# Patient Record
Sex: MALE | Race: WHITE | NOT HISPANIC OR LATINO | URBAN - METROPOLITAN AREA
[De-identification: names, ages, dates, MRNs, and addresses within clinical notes are randomized per-mention and may not be internally consistent; named-entity substitution may affect disease eponyms.]

---

## 2019-11-12 VITALS
SYSTOLIC BLOOD PRESSURE: 99 MMHG | HEIGHT: 66 IN | TEMPERATURE: 98 F | WEIGHT: 197.09 LBS | OXYGEN SATURATION: 97 % | HEART RATE: 71 BPM | RESPIRATION RATE: 16 BRPM | DIASTOLIC BLOOD PRESSURE: 72 MMHG

## 2019-11-12 RX ORDER — GABAPENTIN 400 MG/1
1 CAPSULE ORAL
Qty: 0 | Refills: 0 | DISCHARGE

## 2019-11-12 RX ORDER — POVIDONE-IODINE 5 %
1 AEROSOL (ML) TOPICAL ONCE
Refills: 0 | Status: COMPLETED | OUTPATIENT
Start: 2019-11-13 | End: 2019-11-13

## 2019-11-12 RX ORDER — INFLUENZA VIRUS VACCINE 15; 15; 15; 15 UG/.5ML; UG/.5ML; UG/.5ML; UG/.5ML
0.5 SUSPENSION INTRAMUSCULAR ONCE
Refills: 0 | Status: DISCONTINUED | OUTPATIENT
Start: 2019-11-13 | End: 2019-11-19

## 2019-11-12 NOTE — H&P ADULT - NSHPLABSRESULTS_GEN_ALL_CORE
Preop CBC, BMP, PT/PTT/INR, UA within normal limits- reviewed by medical clearance.   Preop EKG: NSR, reviewed by medical clearance.   CXR: Within normal limits, per medical clearance.

## 2019-11-12 NOTE — H&P ADULT - NSHPPHYSICALEXAM_GEN_ALL_CORE
PE: Decreased ROM to lumbar spine secondary to pain  Rest of PE per medical clearance General: Alert and oriented, NAD  MSK:  Decreased ROM of lumbar spine secondary to pain.  EHL/FHL/TA/Gastro ******  DP's ****  Gross sensation to light touch intact throughout lower extremities **  Remainder of PE as per medical clearance General: Alert and oriented, NAD  MSK:  Decreased ROM of lumbar spine secondary to pain.  RLE EHL/FHL/TA/Gastro   LLE EHL/FHL/TA/Gastro  DP's palpable b/l  Gross sensation to light touch intact throughout lower extremities b/l  Remainder of PE as per medical clearance General: Alert and oriented, NAD  MSK:  Decreased ROM of lumbar spine secondary to pain.  EHL/FHL/TA/Gastro 5/5 b/l  DP's palpable b/l  Gross sensation to light touch intact throughout lower extremities b/l  Remainder of PE as per medical clearance
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
## 2019-11-12 NOTE — H&P ADULT - PROBLEM SELECTOR PLAN 1
Admit to Orthopaedic Service.  Presents today for elective L2-S1 ASF/PSF  Pt medically stable and cleared for procedure today by Dr. Siddiqi

## 2019-11-13 ENCOUNTER — INPATIENT (INPATIENT)
Facility: HOSPITAL | Age: 54
LOS: 5 days | Discharge: HOME | DRG: 454 | End: 2019-11-19
Attending: ORTHOPAEDIC SURGERY | Admitting: ORTHOPAEDIC SURGERY
Payer: COMMERCIAL

## 2019-11-13 ENCOUNTER — RESULT REVIEW (OUTPATIENT)
Age: 54
End: 2019-11-13

## 2019-11-13 DIAGNOSIS — K57.92 DIVERTICULITIS OF INTESTINE, PART UNSPECIFIED, WITHOUT PERFORATION OR ABSCESS WITHOUT BLEEDING: ICD-10-CM

## 2019-11-13 DIAGNOSIS — Z90.49 ACQUIRED ABSENCE OF OTHER SPECIFIED PARTS OF DIGESTIVE TRACT: Chronic | ICD-10-CM

## 2019-11-13 DIAGNOSIS — Z98.890 OTHER SPECIFIED POSTPROCEDURAL STATES: Chronic | ICD-10-CM

## 2019-11-13 DIAGNOSIS — M54.16 RADICULOPATHY, LUMBAR REGION: ICD-10-CM

## 2019-11-13 DIAGNOSIS — K21.0 GASTRO-ESOPHAGEAL REFLUX DISEASE WITH ESOPHAGITIS: ICD-10-CM

## 2019-11-13 PROCEDURE — 22585 ARTHRD ANT NTRBD MIN DSC EA: CPT | Mod: GC,62

## 2019-11-13 PROCEDURE — 22558 ARTHRD ANT NTRBD MIN DSC LUM: CPT | Mod: GC,62

## 2019-11-13 RX ORDER — POLYETHYLENE GLYCOL 3350 17 G/17G
17 POWDER, FOR SOLUTION ORAL DAILY
Refills: 0 | Status: DISCONTINUED | OUTPATIENT
Start: 2019-11-13 | End: 2019-11-19

## 2019-11-13 RX ORDER — OXYCODONE HYDROCHLORIDE 5 MG/1
10 TABLET ORAL EVERY 4 HOURS
Refills: 0 | Status: DISCONTINUED | OUTPATIENT
Start: 2019-11-13 | End: 2019-11-19

## 2019-11-13 RX ORDER — MAGNESIUM HYDROXIDE 400 MG/1
30 TABLET, CHEWABLE ORAL DAILY
Refills: 0 | Status: DISCONTINUED | OUTPATIENT
Start: 2019-11-13 | End: 2019-11-19

## 2019-11-13 RX ORDER — CHLORHEXIDINE GLUCONATE 213 G/1000ML
1 SOLUTION TOPICAL ONCE
Refills: 0 | Status: DISCONTINUED | OUTPATIENT
Start: 2019-11-13 | End: 2019-11-13

## 2019-11-13 RX ORDER — ESOMEPRAZOLE MAGNESIUM 40 MG/1
1 CAPSULE, DELAYED RELEASE ORAL
Qty: 0 | Refills: 0 | DISCHARGE

## 2019-11-13 RX ORDER — SODIUM CHLORIDE 9 MG/ML
1000 INJECTION, SOLUTION INTRAVENOUS
Refills: 0 | Status: DISCONTINUED | OUTPATIENT
Start: 2019-11-13 | End: 2019-11-18

## 2019-11-13 RX ORDER — CEFAZOLIN SODIUM 1 G
2000 VIAL (EA) INJECTION EVERY 8 HOURS
Refills: 0 | Status: DISCONTINUED | OUTPATIENT
Start: 2019-11-13 | End: 2019-11-13

## 2019-11-13 RX ORDER — CEFAZOLIN SODIUM 1 G
2000 VIAL (EA) INJECTION ONCE
Refills: 0 | Status: COMPLETED | OUTPATIENT
Start: 2019-11-13 | End: 2019-11-13

## 2019-11-13 RX ORDER — PANTOPRAZOLE SODIUM 20 MG/1
40 TABLET, DELAYED RELEASE ORAL
Refills: 0 | Status: DISCONTINUED | OUTPATIENT
Start: 2019-11-13 | End: 2019-11-13

## 2019-11-13 RX ORDER — BUPIVACAINE 13.3 MG/ML
20 INJECTION, SUSPENSION, LIPOSOMAL INFILTRATION ONCE
Refills: 0 | Status: DISCONTINUED | OUTPATIENT
Start: 2019-11-13 | End: 2019-11-19

## 2019-11-13 RX ORDER — ACETAMINOPHEN 500 MG
975 TABLET ORAL EVERY 8 HOURS
Refills: 0 | Status: DISCONTINUED | OUTPATIENT
Start: 2019-11-13 | End: 2019-11-19

## 2019-11-13 RX ORDER — ONDANSETRON 8 MG/1
4 TABLET, FILM COATED ORAL EVERY 6 HOURS
Refills: 0 | Status: DISCONTINUED | OUTPATIENT
Start: 2019-11-13 | End: 2019-11-19

## 2019-11-13 RX ORDER — HYDROMORPHONE HYDROCHLORIDE 2 MG/ML
0.5 INJECTION INTRAMUSCULAR; INTRAVENOUS; SUBCUTANEOUS EVERY 4 HOURS
Refills: 0 | Status: DISCONTINUED | OUTPATIENT
Start: 2019-11-13 | End: 2019-11-19

## 2019-11-13 RX ORDER — OXYCODONE HYDROCHLORIDE 5 MG/1
5 TABLET ORAL EVERY 4 HOURS
Refills: 0 | Status: DISCONTINUED | OUTPATIENT
Start: 2019-11-13 | End: 2019-11-19

## 2019-11-13 RX ORDER — SENNA PLUS 8.6 MG/1
2 TABLET ORAL AT BEDTIME
Refills: 0 | Status: DISCONTINUED | OUTPATIENT
Start: 2019-11-13 | End: 2019-11-19

## 2019-11-13 RX ORDER — HYDROMORPHONE HYDROCHLORIDE 2 MG/ML
0.5 INJECTION INTRAMUSCULAR; INTRAVENOUS; SUBCUTANEOUS
Refills: 0 | Status: COMPLETED | OUTPATIENT
Start: 2019-11-13 | End: 2019-11-19

## 2019-11-13 RX ADMIN — OXYCODONE HYDROCHLORIDE 5 MILLIGRAM(S): 5 TABLET ORAL at 23:26

## 2019-11-13 RX ADMIN — SODIUM CHLORIDE 120 MILLILITER(S): 9 INJECTION, SOLUTION INTRAVENOUS at 21:44

## 2019-11-13 RX ADMIN — Medication 1 APPLICATION(S): at 12:31

## 2019-11-13 RX ADMIN — OXYCODONE HYDROCHLORIDE 5 MILLIGRAM(S): 5 TABLET ORAL at 23:54

## 2019-11-13 RX ADMIN — Medication 2000 MILLIGRAM(S): at 22:07

## 2019-11-13 RX ADMIN — CHLORHEXIDINE GLUCONATE 1 APPLICATION(S): 213 SOLUTION TOPICAL at 12:33

## 2019-11-13 NOTE — PROGRESS NOTE ADULT - SUBJECTIVE AND OBJECTIVE BOX
Procedure: Anterior Spine exposure  Surgeon: Dr. Juarez    S: Pt complaining of back pain.  Pt has numbness/tingling over bilaterally feet that was present prior to surgery. Denies CP, SOB, dizziness, nausea or vomiting    O:  T(C): 35.6 (11-13-19 @ 20:35), Max: 35.6 (11-13-19 @ 20:35)  T(F): 96 (11-13-19 @ 20:35), Max: 96 (11-13-19 @ 20:35)  HR: 76 (11-13-19 @ 23:28) (72 - 88)  BP: 99/64 (11-13-19 @ 23:28) (78/55 - 99/64)  RR: 16 (11-13-19 @ 23:28) (12 - 16)  SpO2: 99% (11-13-19 @ 23:28) (94% - 99%)  Wt(kg): --      Gen: NAD, awake, alert  C/V: NSR  Pulm: Nonlabored breathing, no respiratory distress  Abd: soft, mildly distented, non tender  Extrem: No LE swelling, unable to move right 3rd to 5th toes, moves other toes, decrease sensation   Vascular: palpable DP/PT pulses 2+ b/l       A/P: 54yMale s/p above procedure  Diet: NPO  IVF: LR@120ml/hr  Pain/nausea control  Home medication as appropriate  Rest of plan as per primary team

## 2019-11-13 NOTE — PROGRESS NOTE ADULT - SUBJECTIVE AND OBJECTIVE BOX
Ortho Post Op Check    Procedure: ASF/PSF L2-S1  Surgeon: Dr. Montero    Pt endorses pain however states it is tolerable  Denies CP, SOB, N/V.  endorses numbness/tingling over bilaterally feet present prior to surgery    Vital Signs Last 24 Hrs  T(C): 35.6 (11-13-19 @ 20:35), Max: 35.6 (11-13-19 @ 20:35)  T(F): 96 (11-13-19 @ 20:35), Max: 96 (11-13-19 @ 20:35)  HR: 84 (11-13-19 @ 21:35) (72 - 88)  BP: 94/64 (11-13-19 @ 21:35) (78/55 - 96/56)  BP(mean): 74 (11-13-19 @ 21:35) (61 - 74)  RR: 14 (11-13-19 @ 21:35) (14 - 16)  SpO2: 97% (11-13-19 @ 21:35) (94% - 97%)      General: Pt Alert and oriented, NAD  DSG C/D/I.   Pulses: feet wwp, cap refill < 3 secs b/l  Sensation: dimished sensation over L4 at the foot Right > L otherwise intact elsewhere  Motor: firing EHL/FHL/TA/GS. unable to asses strength secondary to paon            Post-op X-Ray:    A/P: 54yMale POD#0 s/p Above procedure  - Stable  - Pain Control  - DVT ppx: SCDs  - Post op abx: Ancef  - PT, WBS: WBAT    Ortho Pager [phone]

## 2019-11-13 NOTE — BRIEF OPERATIVE NOTE - NSICDXBRIEFPREOP_GEN_ALL_CORE_FT
PRE-OP DIAGNOSIS:  Lumbar spinal stenosis 13-Nov-2019 20:12:53  Yeni Ruano
PRE-OP DIAGNOSIS:  Lumbar spinal stenosis 13-Nov-2019 20:12:53  Yeni Ruano

## 2019-11-13 NOTE — BRIEF OPERATIVE NOTE - NSICDXBRIEFPROCEDURE_GEN_ALL_CORE_FT
PROCEDURES:  Surgical exposure for anterior lumbar interbody fusion (ALIF) 14-Nov-2019 15:21:43  Tarsha Mcgee
PROCEDURES:  Fusion, posterior spinal column, lumbar, percutaneous 13-Nov-2019 20:13:49 L4-S1 Yeni Ruano, 2 levels, using cage 13-Nov-2019 20:12:21 ALIF L4/L5, L5/S1 Yeni Ruano

## 2019-11-13 NOTE — BRIEF OPERATIVE NOTE - OPERATION/FINDINGS
Prepped and draped in usual sterile fashion. Incision was made anteriorly right of midline. Exposure was obtained to anterior lumbar spine at appropriate levels, without entering peritoneum. Lumbar vein was identified and saved. Please see orthopedic note for remainder of spine procedure. After procedure was completed, deep dermis was closed with vicryl and skin was closed with 3-0 monocryl.
Please see dictation
Medical Records sent

## 2019-11-13 NOTE — BRIEF OPERATIVE NOTE - NSICDXBRIEFPOSTOP_GEN_ALL_CORE_FT
POST-OP DIAGNOSIS:  Lumbar spinal stenosis 13-Nov-2019 20:13:03  Yeni Ruano
POST-OP DIAGNOSIS:  Lumbar spinal stenosis 13-Nov-2019 20:13:03  Yeni Ruano

## 2019-11-14 ENCOUNTER — TRANSCRIPTION ENCOUNTER (OUTPATIENT)
Age: 54
End: 2019-11-14

## 2019-11-14 LAB
ANION GAP SERPL CALC-SCNC: 13 MMOL/L — SIGNIFICANT CHANGE UP (ref 5–17)
BUN SERPL-MCNC: 15 MG/DL — SIGNIFICANT CHANGE UP (ref 7–23)
CALCIUM SERPL-MCNC: 8.4 MG/DL — SIGNIFICANT CHANGE UP (ref 8.4–10.5)
CHLORIDE SERPL-SCNC: 106 MMOL/L — SIGNIFICANT CHANGE UP (ref 96–108)
CO2 SERPL-SCNC: 20 MMOL/L — LOW (ref 22–31)
CREAT SERPL-MCNC: 0.85 MG/DL — SIGNIFICANT CHANGE UP (ref 0.5–1.3)
GLUCOSE SERPL-MCNC: 135 MG/DL — HIGH (ref 70–99)
HCT VFR BLD CALC: 40.6 % — SIGNIFICANT CHANGE UP (ref 39–50)
HGB BLD-MCNC: 13.5 G/DL — SIGNIFICANT CHANGE UP (ref 13–17)
MCHC RBC-ENTMCNC: 28.4 PG — SIGNIFICANT CHANGE UP (ref 27–34)
MCHC RBC-ENTMCNC: 33.3 GM/DL — SIGNIFICANT CHANGE UP (ref 32–36)
MCV RBC AUTO: 85.3 FL — SIGNIFICANT CHANGE UP (ref 80–100)
NRBC # BLD: 0 /100 WBCS — SIGNIFICANT CHANGE UP (ref 0–0)
PLATELET # BLD AUTO: 180 K/UL — SIGNIFICANT CHANGE UP (ref 150–400)
POTASSIUM SERPL-MCNC: 4.3 MMOL/L — SIGNIFICANT CHANGE UP (ref 3.5–5.3)
POTASSIUM SERPL-SCNC: 4.3 MMOL/L — SIGNIFICANT CHANGE UP (ref 3.5–5.3)
RBC # BLD: 4.76 M/UL — SIGNIFICANT CHANGE UP (ref 4.2–5.8)
RBC # FLD: 13.3 % — SIGNIFICANT CHANGE UP (ref 10.3–14.5)
SODIUM SERPL-SCNC: 139 MMOL/L — SIGNIFICANT CHANGE UP (ref 135–145)
WBC # BLD: 16.19 K/UL — HIGH (ref 3.8–10.5)
WBC # FLD AUTO: 16.19 K/UL — HIGH (ref 3.8–10.5)

## 2019-11-14 RX ORDER — SENNA PLUS 8.6 MG/1
2 TABLET ORAL
Qty: 0 | Refills: 0 | DISCHARGE
Start: 2019-11-14

## 2019-11-14 RX ORDER — DEXAMETHASONE 0.5 MG/5ML
4 ELIXIR ORAL EVERY 6 HOURS
Refills: 0 | Status: COMPLETED | OUTPATIENT
Start: 2019-11-14 | End: 2019-11-15

## 2019-11-14 RX ORDER — CYCLOBENZAPRINE HYDROCHLORIDE 10 MG/1
5 TABLET, FILM COATED ORAL EVERY 8 HOURS
Refills: 0 | Status: DISCONTINUED | OUTPATIENT
Start: 2019-11-14 | End: 2019-11-19

## 2019-11-14 RX ORDER — ACETAMINOPHEN 500 MG
650 TABLET ORAL ONCE
Refills: 0 | Status: COMPLETED | OUTPATIENT
Start: 2019-11-14 | End: 2019-11-14

## 2019-11-14 RX ORDER — POLYETHYLENE GLYCOL 3350 17 G/17G
17 POWDER, FOR SOLUTION ORAL
Qty: 0 | Refills: 0 | DISCHARGE
Start: 2019-11-14

## 2019-11-14 RX ADMIN — POLYETHYLENE GLYCOL 3350 17 GRAM(S): 17 POWDER, FOR SOLUTION ORAL at 19:16

## 2019-11-14 RX ADMIN — HYDROMORPHONE HYDROCHLORIDE 0.5 MILLIGRAM(S): 2 INJECTION INTRAMUSCULAR; INTRAVENOUS; SUBCUTANEOUS at 09:30

## 2019-11-14 RX ADMIN — HYDROMORPHONE HYDROCHLORIDE 0.5 MILLIGRAM(S): 2 INJECTION INTRAMUSCULAR; INTRAVENOUS; SUBCUTANEOUS at 17:45

## 2019-11-14 RX ADMIN — HYDROMORPHONE HYDROCHLORIDE 0.5 MILLIGRAM(S): 2 INJECTION INTRAMUSCULAR; INTRAVENOUS; SUBCUTANEOUS at 13:15

## 2019-11-14 RX ADMIN — CYCLOBENZAPRINE HYDROCHLORIDE 5 MILLIGRAM(S): 10 TABLET, FILM COATED ORAL at 21:43

## 2019-11-14 RX ADMIN — HYDROMORPHONE HYDROCHLORIDE 0.5 MILLIGRAM(S): 2 INJECTION INTRAMUSCULAR; INTRAVENOUS; SUBCUTANEOUS at 22:43

## 2019-11-14 RX ADMIN — Medication 650 MILLIGRAM(S): at 15:26

## 2019-11-14 RX ADMIN — HYDROMORPHONE HYDROCHLORIDE 0.5 MILLIGRAM(S): 2 INJECTION INTRAMUSCULAR; INTRAVENOUS; SUBCUTANEOUS at 23:00

## 2019-11-14 RX ADMIN — OXYCODONE HYDROCHLORIDE 5 MILLIGRAM(S): 5 TABLET ORAL at 04:28

## 2019-11-14 RX ADMIN — Medication 4 MILLIGRAM(S): at 18:37

## 2019-11-14 RX ADMIN — OXYCODONE HYDROCHLORIDE 5 MILLIGRAM(S): 5 TABLET ORAL at 14:03

## 2019-11-14 RX ADMIN — Medication 4 MILLIGRAM(S): at 12:26

## 2019-11-14 RX ADMIN — HYDROMORPHONE HYDROCHLORIDE 0.5 MILLIGRAM(S): 2 INJECTION INTRAMUSCULAR; INTRAVENOUS; SUBCUTANEOUS at 13:02

## 2019-11-14 RX ADMIN — HYDROMORPHONE HYDROCHLORIDE 0.5 MILLIGRAM(S): 2 INJECTION INTRAMUSCULAR; INTRAVENOUS; SUBCUTANEOUS at 09:00

## 2019-11-14 RX ADMIN — OXYCODONE HYDROCHLORIDE 5 MILLIGRAM(S): 5 TABLET ORAL at 14:20

## 2019-11-14 RX ADMIN — HYDROMORPHONE HYDROCHLORIDE 0.5 MILLIGRAM(S): 2 INJECTION INTRAMUSCULAR; INTRAVENOUS; SUBCUTANEOUS at 00:21

## 2019-11-14 RX ADMIN — HYDROMORPHONE HYDROCHLORIDE 0.5 MILLIGRAM(S): 2 INJECTION INTRAMUSCULAR; INTRAVENOUS; SUBCUTANEOUS at 00:40

## 2019-11-14 RX ADMIN — HYDROMORPHONE HYDROCHLORIDE 0.5 MILLIGRAM(S): 2 INJECTION INTRAMUSCULAR; INTRAVENOUS; SUBCUTANEOUS at 04:47

## 2019-11-14 RX ADMIN — Medication 650 MILLIGRAM(S): at 15:45

## 2019-11-14 RX ADMIN — HYDROMORPHONE HYDROCHLORIDE 0.5 MILLIGRAM(S): 2 INJECTION INTRAMUSCULAR; INTRAVENOUS; SUBCUTANEOUS at 17:26

## 2019-11-14 RX ADMIN — HYDROMORPHONE HYDROCHLORIDE 0.5 MILLIGRAM(S): 2 INJECTION INTRAMUSCULAR; INTRAVENOUS; SUBCUTANEOUS at 04:32

## 2019-11-14 NOTE — DISCHARGE NOTE PROVIDER - NSDCCPCAREPLAN_GEN_ALL_CORE_FT
PRINCIPAL DISCHARGE DIAGNOSIS  Diagnosis: Lumbar radiculopathy  Assessment and Plan of Treatment: Lumbar radiculopathy

## 2019-11-14 NOTE — PHYSICAL THERAPY INITIAL EVALUATION ADULT - MANUAL MUSCLE TESTING RESULTS, REHAB EVAL
Right UE, Left hand, wrist, elbow 5/5. Left shoulder 3-/5 due to recent surgery. Left knee and ankle 5/5. Right knee ext 3-/5, ankle DF 3-/5. Hips not tested due to pain in back

## 2019-11-14 NOTE — DISCHARGE NOTE PROVIDER - PROVIDER TOKENS
PROVIDER:[TOKEN:[[id number]]] PROVIDER:[TOKEN:[[id number]],FOLLOWUP:[1 week]],PROVIDER:[TOKEN:[[id number]],FOLLOWUP:[2 weeks]]

## 2019-11-14 NOTE — DISCHARGE NOTE PROVIDER - NSDCMRMEDTOKEN_GEN_ALL_CORE_FT
bisacodyl 10 mg rectal suppository: 1 suppository(ies) rectal once a day, As needed, If no bowel movement by postoperative day #2  esomeprazole 40 mg oral delayed release capsule: 1 cap(s) orally once a day  polyethylene glycol 3350 oral powder for reconstitution: 17 gram(s) orally once a day  senna oral tablet: 2 tab(s) orally once a day (at bedtime), As needed, Constipation bisacodyl 10 mg rectal suppository: 1 suppository(ies) rectal once a day, As needed, If no bowel movement by postoperative day #2  cyclobenzaprine 5 mg oral tablet: 1 tab(s) orally every 8 hours, as needed, muscle relaxant MDD:3    esomeprazole 40 mg oral delayed release capsule: 1 cap(s) orally once a day  oxyCODONE-acetaminophen 10 mg-325 mg oral tablet: 1/2 to 1 tab(s) orally every 4 hours, as needed, pain MDD:6    polyethylene glycol 3350 oral powder for reconstitution: 17 gram(s) orally once a day  senna oral tablet: 2 tab(s) orally once a day (at bedtime), As needed, Constipation bisacodyl 10 mg rectal suppository: 1 suppository(ies) rectal once a day, As needed, If no bowel movement by postoperative day #2  cyclobenzaprine 5 mg oral tablet: 1 tab(s) orally every 8 hours, as needed, muscle relaxant MDD:3    esomeprazole 40 mg oral delayed release capsule: 1 cap(s) orally once a day  gabapentin 300 mg oral capsule: 1 cap(s) orally 3 times a day  oxyCODONE-acetaminophen 10 mg-325 mg oral tablet: 1/2 to 1 tab(s) orally every 4 hours, as needed, pain MDD:6    polyethylene glycol 3350 oral powder for reconstitution: 17 gram(s) orally once a day  senna oral tablet: 2 tab(s) orally once a day (at bedtime), As needed, Constipation

## 2019-11-14 NOTE — DISCHARGE NOTE PROVIDER - CARE PROVIDERS DIRECT ADDRESSES
,DirectAddress_Unknown ,DirectAddress_Unknown,[email or web address]

## 2019-11-14 NOTE — DISCHARGE NOTE PROVIDER - NSDCFUADDINST_GEN_ALL_CORE_FT
No strenuous activity (bending/twisting), heavy lifting, driving or returning to work until cleared by MD.  You have prineo incisional dressing  You may shower.   Try to have regular bowel movements, take stool softener or laxative if necessary.  May take pepcid or zantac for upset stomach.  Ice affected areas to decrease swelling.  Call to schedule an appt with Dr. Montero for follow up, if you have staples or sutures they will be removed in office.  Contact your doctor if you experience: fever greater than 101.5, chills, chest pain, difficulty breathing, redness or excessive drainage around the incision, other concerns. No strenuous activity (bending/twisting), heavy lifting, driving or returning to work until cleared by MD.    You may take showers. Your dressing is water resistant. Let soapy water fall over incision and pat dry.   No soaking in bathtubs.  Leave incision open to air. Keep incision clean and dry. Do not remove the dressing/tape overlying your incision.    Try to have regular bowel movements, take stool softener or laxative if necessary.  May take pepcid or zantac for upset stomach.  Ice affected areas to decrease swelling.  Call to schedule an appt with Dr. Montero for follow up, if you have staples or sutures they will be removed in office.    Make an appointment with Dr. Harris from Neurology for a possible EMG.     Contact your doctor if you experience: fever greater than 101.5, chills, chest pain, difficulty breathing, redness or excessive drainage around the incision, other concerns.
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
## 2019-11-14 NOTE — PROGRESS NOTE ADULT - ASSESSMENT
A/P: 54y Male s/p L4-S1 ALIF/PSF on 11/13    - Stable  - Pain control as per Pain mgmt recs  - Nausea control/Bowel regiment  - Patient passing gas as of this AM -- should advance to CLD  - Home meds  - PT: pending eval  - WBAT  - Pull Key today for TOV  - DVT ppx: Ana    Ortho Pager [phone]

## 2019-11-14 NOTE — PROGRESS NOTE ADULT - ASSESSMENT
54 year old male s/p ASF/PSF L2-S1    Diet: NPO until passing flatus  Abdominal incision c/d/i  Rest of plan as per primary team  Vascular surgery to follow, please call [phone] with any questions

## 2019-11-14 NOTE — PHYSICAL THERAPY INITIAL EVALUATION ADULT - ACTIVE RANGE OF MOTION EXAMINATION, REHAB EVAL
Left shoulder flex decreased 0-90.  Rest of Left UE WNL. Right ankle DF decreased .Rest of right LE WFL/deficits as listed below/Right UE Active ROM was WNL (within normal limits)/LLE Active ROM was WNL (within normal limits)

## 2019-11-14 NOTE — DISCHARGE NOTE PROVIDER - NSDCACTIVITY_GEN_ALL_CORE
Showering allowed/Do not make important decisions/Do not drive or operate machinery Do not make important decisions/Showering allowed/Walking - Indoors allowed/Do not drive or operate machinery

## 2019-11-14 NOTE — DISCHARGE NOTE PROVIDER - HOSPITAL COURSE
Admit- 11/13/19    OR- 11/13/19 anterior lumbar interbody fusion L4-S1, posterior spinal fusion L4-S1    Periop Antibx    DVT ppx    PT     Pain mgt Admit- 11/13/19    OR- 11/13/19 anterior lumbar interbody fusion L4-S1, posterior spinal fusion L4-S1    Periop Antibx    DVT ppx    PT     Pain management    Neurology consult     Vascular consult

## 2019-11-14 NOTE — DISCHARGE NOTE PROVIDER - NSDCCPTREATMENT_GEN_ALL_CORE_FT
PRINCIPAL PROCEDURE  Procedure: Fusion, posterior spinal column, lumbar, percutaneous  Findings and Treatment: L4-S1      SECONDARY PROCEDURE  Procedure: ALIF, 2 levels, using cage  Findings and Treatment: ALIF L4/L5, L5/S1

## 2019-11-14 NOTE — PHYSICAL THERAPY INITIAL EVALUATION ADULT - GENERAL OBSERVATIONS, REHAB EVAL
Received patient in PACU supine in bed + telemetry, SCDs, with complaints of back and abdominal pain . PONCHO Moreno present and aware

## 2019-11-14 NOTE — PHYSICAL THERAPY INITIAL EVALUATION ADULT - ADDITIONAL COMMENTS
Patient lives with spouse in elevator building. Patient states he occ asionally used to ambulate with RW but now doesn't have the walker at home. Reports 1 fall in last 6 months

## 2019-11-14 NOTE — PROGRESS NOTE ADULT - SUBJECTIVE AND OBJECTIVE BOX
Orthopaedic Spine Resident Note    S:  No acute overnight events.  Pain well controlled.    No fevers/chills/shortness of breath/chest pain/new neurologic complaints.    O:  Vital Signs Last 24 Hrs  T(C): 38.2 (14 Nov 2019 09:08), Max: 38.2 (14 Nov 2019 09:08)  T(F): 100.8 (14 Nov 2019 09:08), Max: 100.8 (14 Nov 2019 09:08)  HR: 112 (14 Nov 2019 09:15) (72 - 112)  BP: 109/734 (14 Nov 2019 09:08) (78/55 - 109/734)  BP(mean): 71 (14 Nov 2019 05:38) (61 - 75)  RR: 18 (14 Nov 2019 09:15) (12 - 19)  SpO2: 95% (14 Nov 2019 09:15) (94% - 100%)    General: Well-appearing adult Male lying supine; NAD; A&Ox3; Key in place  Abdomen: Prineo Dressing C/D/I; Abdomen minimally distended  Back: Prineo Dressing CDI x2  Motor:  LLE- Hip Flex/Knee Ext 5/5; TA/EHL/GS 4+/5 strength  RLE- Hip Flex/Knee Ext 5/5; TA/EHL/GS 4/5 strength  Sensory:  LLE- Diminished sensation corresponding to L4-S1 dermatomes better than LLE; Otherwise SILT L2-3 dermatomes   RLE- Diminished sensation corresponding to L4-S1 dermatomes worse than LLE; Otherwise SILT L2-3 dermatomes  Vascular:  Feet WWP; DP 2+ bilaterally; Cap refill < 2 sec    I&O's Detail    13 Nov 2019 07:01  -  14 Nov 2019 07:00  --------------------------------------------------------  IN:    lactated ringers.: 3080 mL  Total IN: 3080 mL    OUT:    Estimated Blood Loss: 120 mL    Indwelling Catheter - Urethral: 1025 mL  Total OUT: 1145 mL    Total NET: 1935 mL      14 Nov 2019 07:01  -  14 Nov 2019 10:26  --------------------------------------------------------  IN:    lactated ringers.: 240 mL  Total IN: 240 mL    OUT:    Indwelling Catheter - Urethral: 1100 mL  Total OUT: 1100 mL    Total NET: -860 mL          Labs:                        13.5   16.19 )-----------( 180      ( 14 Nov 2019 05:23 )             40.6     14 Nov 2019 05:23    139    |  106    |  15     ----------------------------<  135    4.3     |  20     |  0.85     Ca    8.4        14 Nov 2019 05:23

## 2019-11-14 NOTE — DISCHARGE NOTE PROVIDER - CARE PROVIDER_API CALL
Tj Montero)  Orthopaedic Surgery  [facility] [address] [place] [id number]  Phone: [phone]  Fax: [phone]  Follow Up Time: Tj Montero)  Orthopaedic Surgery  [address] [address]  Phone: [phone]  Fax: [phone]  Follow Up Time: 1 week    Micky Harris)  Neurology; Neuromuscular Medicine  [address], [address] [place] [address]  Phone: [phone]  Fax: [phone]  Follow Up Time: 2 weeks

## 2019-11-14 NOTE — PROGRESS NOTE ADULT - SUBJECTIVE AND OBJECTIVE BOX
24 hr events    Subjective: Patient seen and examined bedside in PACU. Resting comfortably in bed. Abdominal incision with mild pain within limits of expected post op course. Patient complaining of bilateral foot numbness R>L similarly to how he felt pre operatively. Patient tolerating water. Feels like he has to pass gas, but has not done so yet. No BM.      Vital Signs Last 24 Hrs  T(C): 37 (14 Nov 2019 08:00), Max: 37 (14 Nov 2019 08:00)  T(F): 98.6 (14 Nov 2019 08:00), Max: 98.6 (14 Nov 2019 08:00)  HR: 98 (14 Nov 2019 08:00) (72 - 98)  BP: 102/52 (14 Nov 2019 08:00) (78/55 - 102/52)  BP(mean): 71 (14 Nov 2019 05:38) (61 - 75)  RR: 18 (14 Nov 2019 08:00) (12 - 18)  SpO2: 96% (14 Nov 2019 08:00) (94% - 100%)    I&O's Summary    13 Nov 2019 07:01  -  14 Nov 2019 07:00  --------------------------------------------------------  IN: 3080 mL / OUT: 1145 mL / NET: 1935 mL    14 Nov 2019 07:01  -  14 Nov 2019 08:39  --------------------------------------------------------  IN: 120 mL / OUT: 400 mL / NET: -280 mL        Physical Exam:  Gen: NAD, awake, alert  C/V: NSR  Pulm: Nonlabored breathing, no respiratory distress  Abd: soft, mildly distended non tender  Extrem: No LE swelling, unable to move right 3rd to 5th toes, moves other toes, decrease sensation   Vascular: palpable DP/PT pulses 2+ b/l     Lines/drains/tubes:    LABS:                        13.5   16.19 )-----------( 180      ( 14 Nov 2019 05:23 )             40.6     11-14    139  |  106  |  15  ----------------------------<  135<H>  4.3   |  20<L>  |  0.85    Ca    8.4      14 Nov 2019 05:23            CAPILLARY BLOOD GLUCOSE          RADIOLOGY & ADDITIONAL TESTS:

## 2019-11-14 NOTE — PROGRESS NOTE ADULT - SUBJECTIVE AND OBJECTIVE BOX
ORTHO NOTE    [x ] Pt seen/examined with Dr. Montero at 9:45am  [ ] Pt without any complaints/in NAD.    [ x] Pt complains of: right leg numbness and heaviness post-operatively.        ROS: [ ] Fever  [ ] Chills  [ ] CP [ ] SOB [ ] Dysnea  [ ] Palpitations [ ] Cough [ ] N/V/C/D [ ] Paresthia [ ] Other     [x] ROS  otherwise negative    .    PHYSICAL EXAM:    Vital Signs Last 24 Hrs  T(C): 38.2 (14 Nov 2019 10:51), Max: 38.2 (14 Nov 2019 09:08)  T(F): 100.8 (14 Nov 2019 10:51), Max: 100.8 (14 Nov 2019 09:08)  HR: 111 (14 Nov 2019 10:51) (72 - 112)  BP: 101/71 (14 Nov 2019 10:51) (78/55 - 109/74)  BP(mean): 71 (14 Nov 2019 05:38) (61 - 75)  RR: 18 (14 Nov 2019 10:51) (12 - 19)  SpO2: 95% (14 Nov 2019 10:51) (94% - 100%)    I&O's Detail    13 Nov 2019 07:01  -  14 Nov 2019 07:00  --------------------------------------------------------  IN:    lactated ringers.: 3080 mL  Total IN: 3080 mL    OUT:    Estimated Blood Loss: 120 mL    Indwelling Catheter - Urethral: 1025 mL  Total OUT: 1145 mL    Total NET: 1935 mL      14 Nov 2019 07:01  -  14 Nov 2019 11:33  --------------------------------------------------------  IN:    lactated ringers.: 480 mL  Total IN: 480 mL    OUT:    Indwelling Catheter - Urethral: 1450 mL  Total OUT: 1450 mL    Total NET: -970 mL           CAPILLARY BLOOD GLUCOSE                      Neuro: AAOX3    Lungs: Spo2 93% on RA with IS 96-97%    CV: sinus tachycardia HR 110s    ABD: mild abdominal distension, BSx4, soft, nontender  abdominal prineo dressing cdi with no noted drainage/erythema/edema    Ext:  posterior prineo dressings cdi with no noted drainage/erythema/edema  Motor:  LLE- Hip Flex/Knee Ext 5/5; TA/EHL/GS 4+/5 strength  RLE- Hip Flex/Knee Ext 5/5; TA/EHL/GS 4/5 strength  Sensory:  LLE- Diminished sensation corresponding to L4-S1 dermatomes better than LLE; Otherwise SILT L2-3 dermatomes   RLE- Diminished sensation corresponding to L4-S1 dermatomes worse than LLE; Otherwise SILT L2-3 dermatomes  Vascular:  Feet WWP; DP 2+ bilaterally; Cap refill < 2 sec    LABS                        13.5   16.19 )-----------( 180      ( 14 Nov 2019 05:23 )             40.6                                11-14    139  |  106  |  15  ----------------------------<  135<H>  4.3   |  20<L>  |  0.85    Ca    8.4      14 Nov 2019 05:23        [ ] Other Labs  [ ] None ordered            Please check or [place] when present:  •  Heart Failure:    [ ] Acute        [ ]  Acute on Chronic        [ ] Chronic         [ ] Diastolic     [ ]  Combined    •  ARNOLD:     [ ] ATN        [ ]  Renal medullary necrosis       [ ]  Renal cortical necrosis                  [ ] Other pathological Lesion:  •  CKD:  [ ] Stage I   [ ] Stage II  [ ] Stage III    [ ]Stage IV   [ ]  CKD V   [ ]  Other/Unspecified:    •  Abdominal Nutritional Status:   [ ] Malnutrition-See Nutrition note    [ ] Cachexia   [ ]  Other        [ ] Supplement ordered:            [ ] Morbid Obesity: BMI >=40         ASSESSMENT/PLAN:      STATUS POST: pod1 L4-S1 ALIF/PSF  pain control- added decadron 4mg q 6 x6 doses to alleviate R LE numbness/heaviness, acetaminophen 975mg q 8 standing, oxycodone 5-10mg q 4 prn moderate to severe pain, flexeril 5mg q 8 as needed muscle spasm  bowel regimen  continue telemetry to monitor HR  remove haley catheter TOV  CONTINUE:          [ ] PT- wbat, spinal precautions    [ ] DVT PPX- scd    [ ] Pain Mgt- see above    [ ] Dispo plan- pending PT evaluation

## 2019-11-15 LAB
ANION GAP SERPL CALC-SCNC: 10 MMOL/L — SIGNIFICANT CHANGE UP (ref 5–17)
BUN SERPL-MCNC: 13 MG/DL — SIGNIFICANT CHANGE UP (ref 7–23)
CALCIUM SERPL-MCNC: 8.8 MG/DL — SIGNIFICANT CHANGE UP (ref 8.4–10.5)
CHLORIDE SERPL-SCNC: 106 MMOL/L — SIGNIFICANT CHANGE UP (ref 96–108)
CO2 SERPL-SCNC: 26 MMOL/L — SIGNIFICANT CHANGE UP (ref 22–31)
CREAT SERPL-MCNC: 0.86 MG/DL — SIGNIFICANT CHANGE UP (ref 0.5–1.3)
GLUCOSE SERPL-MCNC: 146 MG/DL — HIGH (ref 70–99)
HCT VFR BLD CALC: 42 % — SIGNIFICANT CHANGE UP (ref 39–50)
HGB BLD-MCNC: 14.1 G/DL — SIGNIFICANT CHANGE UP (ref 13–17)
MCHC RBC-ENTMCNC: 28.9 PG — SIGNIFICANT CHANGE UP (ref 27–34)
MCHC RBC-ENTMCNC: 33.6 GM/DL — SIGNIFICANT CHANGE UP (ref 32–36)
MCV RBC AUTO: 86.1 FL — SIGNIFICANT CHANGE UP (ref 80–100)
NRBC # BLD: 0 /100 WBCS — SIGNIFICANT CHANGE UP (ref 0–0)
PLATELET # BLD AUTO: 174 K/UL — SIGNIFICANT CHANGE UP (ref 150–400)
POTASSIUM SERPL-MCNC: 4.9 MMOL/L — SIGNIFICANT CHANGE UP (ref 3.5–5.3)
POTASSIUM SERPL-SCNC: 4.9 MMOL/L — SIGNIFICANT CHANGE UP (ref 3.5–5.3)
RBC # BLD: 4.88 M/UL — SIGNIFICANT CHANGE UP (ref 4.2–5.8)
RBC # FLD: 13.8 % — SIGNIFICANT CHANGE UP (ref 10.3–14.5)
SODIUM SERPL-SCNC: 142 MMOL/L — SIGNIFICANT CHANGE UP (ref 135–145)
WBC # BLD: 17.86 K/UL — HIGH (ref 3.8–10.5)
WBC # FLD AUTO: 17.86 K/UL — HIGH (ref 3.8–10.5)

## 2019-11-15 PROCEDURE — 72131 CT LUMBAR SPINE W/O DYE: CPT | Mod: 26

## 2019-11-15 RX ORDER — ZALEPLON 10 MG
5 CAPSULE ORAL AT BEDTIME
Refills: 0 | Status: DISCONTINUED | OUTPATIENT
Start: 2019-11-15 | End: 2019-11-19

## 2019-11-15 RX ADMIN — Medication 4 MILLIGRAM(S): at 00:23

## 2019-11-15 RX ADMIN — Medication 975 MILLIGRAM(S): at 21:48

## 2019-11-15 RX ADMIN — HYDROMORPHONE HYDROCHLORIDE 0.5 MILLIGRAM(S): 2 INJECTION INTRAMUSCULAR; INTRAVENOUS; SUBCUTANEOUS at 21:45

## 2019-11-15 RX ADMIN — Medication 975 MILLIGRAM(S): at 14:00

## 2019-11-15 RX ADMIN — Medication 975 MILLIGRAM(S): at 14:59

## 2019-11-15 RX ADMIN — HYDROMORPHONE HYDROCHLORIDE 0.5 MILLIGRAM(S): 2 INJECTION INTRAMUSCULAR; INTRAVENOUS; SUBCUTANEOUS at 21:25

## 2019-11-15 RX ADMIN — Medication 5 MILLIGRAM(S): at 21:49

## 2019-11-15 RX ADMIN — Medication 4 MILLIGRAM(S): at 18:56

## 2019-11-15 RX ADMIN — Medication 4 MILLIGRAM(S): at 05:37

## 2019-11-15 RX ADMIN — POLYETHYLENE GLYCOL 3350 17 GRAM(S): 17 POWDER, FOR SOLUTION ORAL at 16:25

## 2019-11-15 RX ADMIN — Medication 4 MILLIGRAM(S): at 14:59

## 2019-11-15 NOTE — OCCUPATIONAL THERAPY INITIAL EVALUATION ADULT - MODIFIED CLINICAL TEST OF SENSORY INTEGRATION IN BALANCE TEST
Pt performed simulated household mobility/safety activities - ambulated from his room<>hallway ~30ftx2 with RW support and CS/S - Fairly steady with c/o bouts of dizziness

## 2019-11-15 NOTE — PROGRESS NOTE ADULT - SUBJECTIVE AND OBJECTIVE BOX
Vascular Surgery Consult - Progress Note    Subjective:  Reports continued pain in abdomen as well as in his back. Passing flatus, no BM. Tolerating CLD. Reports occasional nausea when standing, no vomiting. Denies CP/SOB. Has been OOB and is able to walk to bathroom.    Vital Signs Last 24 Hrs  T(C): 36.6 (15 Nov 2019 11:40), Max: 37.4 (14 Nov 2019 21:00)  T(F): 97.8 (15 Nov 2019 11:40), Max: 99.3 (14 Nov 2019 21:00)  HR: 75 (15 Nov 2019 11:40) (74 - 106)  BP: 112/70 (15 Nov 2019 11:40) (106/68 - 120/73)  BP(mean): --  RR: 17 (15 Nov 2019 11:40) (17 - 18)  SpO2: 92% (15 Nov 2019 11:40) (91% - 98%)    I&O's Summary  14 Nov 2019 07:01  -  15 Nov 2019 07:00  --------------------------------------------------------  IN: 1440 mL / OUT: 4425 mL / NET: -2985 mL    15 Nov 2019 07:01  -  15 Nov 2019 15:06  --------------------------------------------------------  IN: 300 mL / OUT: 700 mL / NET: -400 mL    Physical Exam:  General: NAD, resting comfortably  Pulmonary: normal resp effort  Abdominal: soft, appropriately tender, non-distended  Extremities: WWP, no LE edema; 2+ DP pulses; SCDs in place  Neuro: A/O x 3    LABS:                     14.1   17.86 )-----------( 174      ( 15 Nov 2019 06:05 )             42.0     11-15  142  |  106  |  13  ----------------------------<  146<H>  4.9   |  26  |  0.86    Ca    8.8      15 Nov 2019 06:05

## 2019-11-15 NOTE — OCCUPATIONAL THERAPY INITIAL EVALUATION ADULT - ADDITIONAL COMMENTS
Pt lives with his wife and child in co-op with 1 flight to enter building and elevator/3 flights reach domain. Pt reports independence with BADLs and mobility prior to admission. Pt denies prior use/possession of Aes/DMEs.
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
## 2019-11-15 NOTE — OCCUPATIONAL THERAPY INITIAL EVALUATION ADULT - GROSSLY INTACT, SENSORY
Pt reports numbness to BLEs and b/l UE digits (h/o carpal tunnel syndrome). Diminished L/T sensation to RLE

## 2019-11-15 NOTE — PROGRESS NOTE ADULT - SUBJECTIVE AND OBJECTIVE BOX
Orthopaedic Surgery Progress Note    Post-operative day #2 s/p L4-S1 ALIF/PSF     Subjective:     Patient seen and examined. Patient complaining of right lower extremity pain/right foot numbness. Complaining of pain to left side of abdomen.       Objective:    Vital Signs Last 24 Hrs  T(C): 36.6 (11-15-19 @ 11:40), Max: 36.9 (11-15-19 @ 09:44)  T(F): 97.8 (11-15-19 @ 11:40), Max: 98.4 (11-15-19 @ 09:44)  HR: 75 (11-15-19 @ 11:40) (75 - 80)  BP: 112/70 (11-15-19 @ 11:40) (106/68 - 115/75)  BP(mean): --  RR: 17 (11-15-19 @ 11:40) (17 - 17)  SpO2: 92% (11-15-19 @ 11:40) (92% - 95%)  AVSS    PE:  General: Patient alert and oriented, NAD  left abdominal incision CDI   Skim warm and well perfused, cap refill brisk   Sensation intact to bilateral lower extremities although diminished to RLE compared to LLE   Motor: per resident note this AM, LLE- Hip Flex/Knee Ext 5/5; TA/EHL/GS 4+/5 strength  RLE- Hip Flex/Knee Ext 5/5; TA/EHL/GS 4/5 strength                          14.1   17.86 )-----------( 174      ( 15 Nov 2019 06:05 )             42.0   15 Nov 2019 06:05    142    |  106    |  13     ----------------------------<  146    4.9     |  26     |  0.86     Ca    8.8        15 Nov 2019 06:05        A/P: 54yMale POD#2 s/p  above procedure  1. Pain control as needed  2. DVT prophylaxis: SCDs  3. PT, weight-bearing status: WBAT  4. Dispo: per Dr. Montero, CT L spine/sacrum ordered to evaluate hardware placement in light of pt complaints of RLE numbness/weakness; f/u results   5. Per Dr. Montero, patient to remain on clear liquid diet however appears that patient was given a regular diet yesterday and per patient he ate grits yesterday; diet switched back to clear liquid diet - advancement per  Dr. Montero   6. Passed TOV on 11/14  7. Further plan pending CT results                  Ortho Pager [phone] Orthopaedic Surgery Progress Note    Post-operative day #2 s/p L4-S1 ALIF/PSF     Subjective:     Patient seen and examined with Dr. Montero. Patient complaining of right lower extremity pain/right foot numbness. Complaining of pain to left side of abdomen.       Objective:    Vital Signs Last 24 Hrs  T(C): 36.6 (11-15-19 @ 11:40), Max: 36.9 (11-15-19 @ 09:44)  T(F): 97.8 (11-15-19 @ 11:40), Max: 98.4 (11-15-19 @ 09:44)  HR: 75 (11-15-19 @ 11:40) (75 - 80)  BP: 112/70 (11-15-19 @ 11:40) (106/68 - 115/75)  BP(mean): --  RR: 17 (11-15-19 @ 11:40) (17 - 17)  SpO2: 92% (11-15-19 @ 11:40) (92% - 95%)  AVSS    PE:  General: Patient alert and oriented, NAD  left abdominal incision CDI   Skim warm and well perfused, cap refill brisk   Sensation intact to bilateral lower extremities although diminished to RLE compared to LLE   Motor: per resident note this AM, LLE- Hip Flex/Knee Ext 5/5; TA/EHL/GS 4+/5 strength  RLE- Hip Flex/Knee Ext 5/5; TA/EHL/GS 4/5 strength    Abdomen auscultated - hypoactive bowel sounds                          14.1   17.86 )-----------( 174      ( 15 Nov 2019 06:05 )             42.0   15 Nov 2019 06:05    142    |  106    |  13     ----------------------------<  146    4.9     |  26     |  0.86     Ca    8.8        15 Nov 2019 06:05        A/P: 54yMale POD#2 s/p  above procedure  1. Pain control as needed  2. DVT prophylaxis: SCDs  3. PT, weight-bearing status: WBAT  4. Dispo: per Dr. Montero, CT L spine/sacrum ordered to evaluate hardware placement in light of pt complaints of RLE numbness/weakness; f/u results   5. Per Dr. Montero, patient to remain on clear liquid diet however appears that patient was given a regular diet yesterday and per patient he ate grits yesterday; diet switched back to clear liquid diet - advancement per  Dr. Montero   6. Passed TOV on 11/14    Addendum:  CT imaging reviewed by Dr. Montero. Per Dr. Montero, if patient has more active bowel sounds tomorrow AM, may advance to regular diet. Per Dr. Motnero, neurology consult placed for right foot numbness.                 Ortho Pager [phone]

## 2019-11-15 NOTE — OCCUPATIONAL THERAPY INITIAL EVALUATION ADULT - GENERAL OBSERVATIONS, REHAB EVAL
Pt is right hand dominant. Pt's PONCHO Ross aware of intent to eval/tx; cleared Pt. Pt received in supine - + heplock, SCDs. Pt agreeable to OT.

## 2019-11-15 NOTE — OCCUPATIONAL THERAPY INITIAL EVALUATION ADULT - PERSONAL SAFETY AND JUDGMENT, REHAB EVAL
Pt with impulsivity and at times resistive to directives; requires edu to increase safety awareness. during functional activities

## 2019-11-15 NOTE — PROGRESS NOTE ADULT - SUBJECTIVE AND OBJECTIVE BOX
Orthopaedic Spine Resident Note    S:  No acute overnight events.  Pain well controlled.    No fevers/chills/shortness of breath/chest pain.  Patient continuing to complain of RLE numbness/weakness involving dorsal/plantar surfaces of the foot  Pt also w complaints of mild abdominal pain    O:  Vital Signs Last 24 Hrs  T(C): 36.9 (15 Nov 2019 09:44), Max: 38.7 (14 Nov 2019 15:04)  T(F): 98.4 (15 Nov 2019 09:44), Max: 101.6 (14 Nov 2019 15:04)  HR: 80 (15 Nov 2019 09:44) (74 - 116)  BP: 106/68 (15 Nov 2019 09:44) (93/69 - 120/73)  BP(mean): 78 (14 Nov 2019 14:11) (78 - 78)  RR: 17 (15 Nov 2019 09:44) (16 - 24)  SpO2: 94% (15 Nov 2019 09:44) (91% - 98%)    General: Well-appearing adult Male lying supine; NAD; A&Ox3  Abdomen: Prineo Dressing C/D/I; Abdomen minimally distended  Back: Prineo Dressing CDI x2  Motor:  LLE- Hip Flex/Knee Ext 5/5; TA/EHL/GS 4+/5 strength  RLE- Hip Flex/Knee Ext 5/5; TA/EHL/GS 4/5 strength  Sensory:  LLE- Diminished sensation corresponding to L4-S1 dermatomes better than RLE that is improved since yesterday; Otherwise SILT L2-3 dermatomes   RLE- Diminished sensation corresponding to L4-S1 dermatomes worse than LLE that is unchanged from yesterday; Otherwise SILT L2-3 dermatomes  Vascular:  Feet WWP; DP 2+ bilaterally; Cap refill < 2 sec          Labs:                        14.1   17.86 )-----------( 174      ( 15 Nov 2019 06:05 )             42.0       11-15    142  |  106  |  13  ----------------------------<  146<H>  4.9   |  26  |  0.86    Ca    8.8      15 Nov 2019 06:05

## 2019-11-15 NOTE — CONSULT NOTE ADULT - ASSESSMENT
55yo M PMHx lumbar stenosis s/p anterior lumbar interbody fusion (ALIF) and posterior spinal fusion (PSF) [11/13] complaining worsening subacute on acute right leg weakness and right foot numbness.     -Based on history, there seems to be a subacute right leg weakness and right foot numbness present before surgery. However, symptoms have worsened after 11/13 suggesting that there may be damage to nerves involving leg.  -Further evaluation and recommendations will be made upon examination by Attending

## 2019-11-15 NOTE — OCCUPATIONAL THERAPY INITIAL EVALUATION ADULT - PERTINENT HX OF CURRENT PROBLEM, REHAB EVAL
Lumbar radiculopathy. S/P Fusion, posterior spinal column, lumbar, percutaneous 13-Nov-2019. S/P ASF/PSF L2-S1

## 2019-11-15 NOTE — PROGRESS NOTE ADULT - ASSESSMENT
A/P: 54y Male s/p L4-S1 ALIF/PSF on 11/13    - Stable  - Pain control  - Nausea control/Bowel regiment  - Patient passing gas but yet to have BM -- keep on CLD for now -- may advance to regular if pt has BM/improvment in abd sxs  - Home meds  - PT: home  - WBAT  - Passed TOV on 11/14  - DVT ppx: SCDs  - Pt still w complaints of RLE L4-S1 numbness/weakness worsened since OR -- CT L spine/Sacrum today to evaluate hardware placement    Ortho Pager [phone]

## 2019-11-15 NOTE — OCCUPATIONAL THERAPY INITIAL EVALUATION ADULT - PLANNED THERAPY INTERVENTIONS, OT EVAL
IADL retraining/motor coordination training/neuromuscular re-education/transfer training/ADL retraining/bed mobility training/strengthening/stretching

## 2019-11-16 LAB
ANION GAP SERPL CALC-SCNC: 11 MMOL/L — SIGNIFICANT CHANGE UP (ref 5–17)
BUN SERPL-MCNC: 16 MG/DL — SIGNIFICANT CHANGE UP (ref 7–23)
CALCIUM SERPL-MCNC: 8.9 MG/DL — SIGNIFICANT CHANGE UP (ref 8.4–10.5)
CHLORIDE SERPL-SCNC: 106 MMOL/L — SIGNIFICANT CHANGE UP (ref 96–108)
CO2 SERPL-SCNC: 23 MMOL/L — SIGNIFICANT CHANGE UP (ref 22–31)
CREAT SERPL-MCNC: 0.82 MG/DL — SIGNIFICANT CHANGE UP (ref 0.5–1.3)
GLUCOSE SERPL-MCNC: 95 MG/DL — SIGNIFICANT CHANGE UP (ref 70–99)
HCT VFR BLD CALC: 44.6 % — SIGNIFICANT CHANGE UP (ref 39–50)
HGB BLD-MCNC: 14.7 G/DL — SIGNIFICANT CHANGE UP (ref 13–17)
MCHC RBC-ENTMCNC: 28.7 PG — SIGNIFICANT CHANGE UP (ref 27–34)
MCHC RBC-ENTMCNC: 33 GM/DL — SIGNIFICANT CHANGE UP (ref 32–36)
MCV RBC AUTO: 86.9 FL — SIGNIFICANT CHANGE UP (ref 80–100)
NRBC # BLD: 0 /100 WBCS — SIGNIFICANT CHANGE UP (ref 0–0)
PLATELET # BLD AUTO: 190 K/UL — SIGNIFICANT CHANGE UP (ref 150–400)
POTASSIUM SERPL-MCNC: 4.1 MMOL/L — SIGNIFICANT CHANGE UP (ref 3.5–5.3)
POTASSIUM SERPL-SCNC: 4.1 MMOL/L — SIGNIFICANT CHANGE UP (ref 3.5–5.3)
RBC # BLD: 5.13 M/UL — SIGNIFICANT CHANGE UP (ref 4.2–5.8)
RBC # FLD: 14 % — SIGNIFICANT CHANGE UP (ref 10.3–14.5)
SODIUM SERPL-SCNC: 140 MMOL/L — SIGNIFICANT CHANGE UP (ref 135–145)
WBC # BLD: 17.97 K/UL — HIGH (ref 3.8–10.5)
WBC # FLD AUTO: 17.97 K/UL — HIGH (ref 3.8–10.5)

## 2019-11-16 RX ORDER — LACTULOSE 10 G/15ML
10 SOLUTION ORAL
Refills: 0 | Status: DISCONTINUED | OUTPATIENT
Start: 2019-11-16 | End: 2019-11-19

## 2019-11-16 RX ADMIN — MAGNESIUM HYDROXIDE 30 MILLILITER(S): 400 TABLET, CHEWABLE ORAL at 15:56

## 2019-11-16 RX ADMIN — HYDROMORPHONE HYDROCHLORIDE 0.5 MILLIGRAM(S): 2 INJECTION INTRAMUSCULAR; INTRAVENOUS; SUBCUTANEOUS at 04:17

## 2019-11-16 RX ADMIN — Medication 975 MILLIGRAM(S): at 10:30

## 2019-11-16 RX ADMIN — HYDROMORPHONE HYDROCHLORIDE 0.5 MILLIGRAM(S): 2 INJECTION INTRAMUSCULAR; INTRAVENOUS; SUBCUTANEOUS at 22:47

## 2019-11-16 RX ADMIN — Medication 975 MILLIGRAM(S): at 09:20

## 2019-11-16 RX ADMIN — HYDROMORPHONE HYDROCHLORIDE 0.5 MILLIGRAM(S): 2 INJECTION INTRAMUSCULAR; INTRAVENOUS; SUBCUTANEOUS at 23:26

## 2019-11-16 RX ADMIN — Medication 5 MILLIGRAM(S): at 22:27

## 2019-11-16 RX ADMIN — HYDROMORPHONE HYDROCHLORIDE 0.5 MILLIGRAM(S): 2 INJECTION INTRAMUSCULAR; INTRAVENOUS; SUBCUTANEOUS at 19:30

## 2019-11-16 RX ADMIN — Medication 975 MILLIGRAM(S): at 18:00

## 2019-11-16 RX ADMIN — HYDROMORPHONE HYDROCHLORIDE 0.5 MILLIGRAM(S): 2 INJECTION INTRAMUSCULAR; INTRAVENOUS; SUBCUTANEOUS at 04:41

## 2019-11-16 RX ADMIN — Medication 975 MILLIGRAM(S): at 17:19

## 2019-11-16 RX ADMIN — Medication 975 MILLIGRAM(S): at 22:52

## 2019-11-16 RX ADMIN — HYDROMORPHONE HYDROCHLORIDE 0.5 MILLIGRAM(S): 2 INJECTION INTRAMUSCULAR; INTRAVENOUS; SUBCUTANEOUS at 19:01

## 2019-11-16 RX ADMIN — Medication 975 MILLIGRAM(S): at 22:47

## 2019-11-16 RX ADMIN — POLYETHYLENE GLYCOL 3350 17 GRAM(S): 17 POWDER, FOR SOLUTION ORAL at 09:20

## 2019-11-16 NOTE — PROGRESS NOTE ADULT - SUBJECTIVE AND OBJECTIVE BOX
Patient was seen and examined at bedside. No acute event overnight. Pt complaints of decreased sensation and tiggling on his r leg when lying and when walking. C/o of not having bms yet.     Vital Signs Last 24 Hrs  T(C): 36.9 (16 Nov 2019 18:47), Max: 37 (16 Nov 2019 04:28)  T(F): 98.5 (16 Nov 2019 18:47), Max: 98.6 (16 Nov 2019 04:28)  HR: 72 (16 Nov 2019 18:47) (53 - 73)  BP: 100/64 (16 Nov 2019 18:47) (100/64 - 114/67)  BP(mean): --  RR: 16 (16 Nov 2019 18:47) (16 - 17)  SpO2: 95% (16 Nov 2019 18:47) (93% - 96%)    Physical Exam:  General: NAD  Pulmonary: Nonlabored breathing, no respiratory distress  Cardiovascular: NSR  Abdominal: soft, NT/ND, incision ok without any evidence of hematoma or inflammation  Extremities: WWP, normal strength, no clubbing/cyanosis/edema, no signs of DVT, no calf or thigh tension.   Neuro: A/O x3, decreased sensation on R leg. Weakness in central muscles b/l LEs  Pulses: palpable distal pulses    Lines/drains/tubes:    I&O's Summary    15 Nov 2019 07:01  -  16 Nov 2019 07:00  --------------------------------------------------------  IN: 300 mL / OUT: 1300 mL / NET: -1000 mL        LABS:                        14.7   17.97 )-----------( 190      ( 16 Nov 2019 10:31 )             44.6     11-16    140  |  106  |  16  ----------------------------<  95  4.1   |  23  |  0.82    Ca    8.9      16 Nov 2019 10:31          CAPILLARY BLOOD GLUCOSE            RADIOLOGY & ADDITIONAL STUDIES: Patient was seen and examined at bedside. No acute event overnight. Pt complaints of decreased sensation and tiggling on his r leg when lying and when walking. C/o of not having bms yet. Pt admits to passing gas.    Vital Signs Last 24 Hrs  T(C): 36.9 (16 Nov 2019 18:47), Max: 37 (16 Nov 2019 04:28)  T(F): 98.5 (16 Nov 2019 18:47), Max: 98.6 (16 Nov 2019 04:28)  HR: 72 (16 Nov 2019 18:47) (53 - 73)  BP: 100/64 (16 Nov 2019 18:47) (100/64 - 114/67)  BP(mean): --  RR: 16 (16 Nov 2019 18:47) (16 - 17)  SpO2: 95% (16 Nov 2019 18:47) (93% - 96%)    Physical Exam:  General: NAD  Pulmonary: Nonlabored breathing, no respiratory distress  Cardiovascular: NSR  Abdominal: soft, NT/ND, incision ok without any evidence of hematoma or inflammation  Extremities: WWP, normal strength, no clubbing/cyanosis/edema, no signs of DVT, no calf or thigh tension.   Neuro: A/O x3, decreased sensation on R leg. Weakness in central muscles b/l LEs  Pulses: palpable distal pulses    Lines/drains/tubes:    I&O's Summary    15 Nov 2019 07:01  -  16 Nov 2019 07:00  --------------------------------------------------------  IN: 300 mL / OUT: 1300 mL / NET: -1000 mL        LABS:                        14.7   17.97 )-----------( 190      ( 16 Nov 2019 10:31 )             44.6     11-16    140  |  106  |  16  ----------------------------<  95  4.1   |  23  |  0.82    Ca    8.9      16 Nov 2019 10:31          CAPILLARY BLOOD GLUCOSE            RADIOLOGY & ADDITIONAL STUDIES:

## 2019-11-16 NOTE — PROGRESS NOTE ADULT - ASSESSMENT
54 year old male s/p ASF/PSF L2-S1    Advance diet as tolerated  awaiting for boweling movement - passing gas already  Continue SCDs  ChemoPPX once ok with primary team  Rest of plan as per primary team  Will follow  Call [phone] with questions

## 2019-11-16 NOTE — PROGRESS NOTE ADULT - SUBJECTIVE AND OBJECTIVE BOX
Ortho Note    Pt still reports RLE numbness   Denies CP, SOB, N/V    Vital Signs Last 24 Hrs  T(C): 36.7 (11-16-19 @ 08:11), Max: 37 (11-16-19 @ 04:28)  T(F): 98 (11-16-19 @ 08:11), Max: 98.6 (11-16-19 @ 04:28)  HR: 65 (11-16-19 @ 08:11) (53 - 65)  BP: 114/67 (11-16-19 @ 08:11) (109/64 - 114/67)  BP(mean): --  RR: 17 (11-16-19 @ 08:11) (16 - 17)  SpO2: 96% (11-16-19 @ 08:11) (94% - 96%)  AVSS    General: Pt Alert and oriented, NAD  Abdomen and back prineo DSG C/D/I  Sensation decreased in right foot compared to left  Motor strength decreased in right foot compared to left  2+ DP pulse BL LE  toes wwp                          14.1   17.86 )-----------( 174      ( 15 Nov 2019 06:05 )             42.0   15 Nov 2019 06:05    142    |  106    |  13     ----------------------------<  146    4.9     |  26     |  0.86           A/P: 54yMale POD# s/p L4-S1 ALIF and perc PSF L4-S1  - Stable  - Pain Control  - DVT ppx: SCDs  - PT, WBS: WBAT  - advanced to regular diet  - awaiting neurology recs  - dispo pending     Ortho Pager [phone]

## 2019-11-17 PROCEDURE — 72146 MRI CHEST SPINE W/O DYE: CPT | Mod: 26

## 2019-11-17 PROCEDURE — 72148 MRI LUMBAR SPINE W/O DYE: CPT | Mod: 26

## 2019-11-17 PROCEDURE — 72141 MRI NECK SPINE W/O DYE: CPT | Mod: 26

## 2019-11-17 RX ADMIN — Medication 975 MILLIGRAM(S): at 18:51

## 2019-11-17 RX ADMIN — OXYCODONE HYDROCHLORIDE 10 MILLIGRAM(S): 5 TABLET ORAL at 18:51

## 2019-11-17 RX ADMIN — HYDROMORPHONE HYDROCHLORIDE 0.5 MILLIGRAM(S): 2 INJECTION INTRAMUSCULAR; INTRAVENOUS; SUBCUTANEOUS at 12:20

## 2019-11-17 RX ADMIN — OXYCODONE HYDROCHLORIDE 10 MILLIGRAM(S): 5 TABLET ORAL at 09:39

## 2019-11-17 RX ADMIN — Medication 975 MILLIGRAM(S): at 03:05

## 2019-11-17 RX ADMIN — Medication 975 MILLIGRAM(S): at 17:18

## 2019-11-17 RX ADMIN — HYDROMORPHONE HYDROCHLORIDE 0.5 MILLIGRAM(S): 2 INJECTION INTRAMUSCULAR; INTRAVENOUS; SUBCUTANEOUS at 03:50

## 2019-11-17 RX ADMIN — POLYETHYLENE GLYCOL 3350 17 GRAM(S): 17 POWDER, FOR SOLUTION ORAL at 11:23

## 2019-11-17 RX ADMIN — Medication 5 MILLIGRAM(S): at 23:02

## 2019-11-17 RX ADMIN — Medication 975 MILLIGRAM(S): at 03:01

## 2019-11-17 RX ADMIN — HYDROMORPHONE HYDROCHLORIDE 0.5 MILLIGRAM(S): 2 INJECTION INTRAMUSCULAR; INTRAVENOUS; SUBCUTANEOUS at 11:21

## 2019-11-17 RX ADMIN — OXYCODONE HYDROCHLORIDE 10 MILLIGRAM(S): 5 TABLET ORAL at 14:50

## 2019-11-17 RX ADMIN — OXYCODONE HYDROCHLORIDE 10 MILLIGRAM(S): 5 TABLET ORAL at 13:51

## 2019-11-17 RX ADMIN — HYDROMORPHONE HYDROCHLORIDE 0.5 MILLIGRAM(S): 2 INJECTION INTRAMUSCULAR; INTRAVENOUS; SUBCUTANEOUS at 20:47

## 2019-11-17 RX ADMIN — OXYCODONE HYDROCHLORIDE 10 MILLIGRAM(S): 5 TABLET ORAL at 10:34

## 2019-11-17 RX ADMIN — OXYCODONE HYDROCHLORIDE 10 MILLIGRAM(S): 5 TABLET ORAL at 19:50

## 2019-11-17 RX ADMIN — HYDROMORPHONE HYDROCHLORIDE 0.5 MILLIGRAM(S): 2 INJECTION INTRAMUSCULAR; INTRAVENOUS; SUBCUTANEOUS at 21:09

## 2019-11-17 RX ADMIN — HYDROMORPHONE HYDROCHLORIDE 0.5 MILLIGRAM(S): 2 INJECTION INTRAMUSCULAR; INTRAVENOUS; SUBCUTANEOUS at 03:01

## 2019-11-17 RX ADMIN — OXYCODONE HYDROCHLORIDE 10 MILLIGRAM(S): 5 TABLET ORAL at 23:02

## 2019-11-17 RX ADMIN — MAGNESIUM HYDROXIDE 30 MILLILITER(S): 400 TABLET, CHEWABLE ORAL at 13:48

## 2019-11-17 NOTE — PROGRESS NOTE ADULT - ASSESSMENT
54 year old male s/p ASF/PSF L2-S1    Tolerating diet  Continue SCDs  DVT ppx once ok with primary team  Rest of plan as per primary team  Will follow  Call [phone] with questions  f/u MRI ordered by primary team

## 2019-11-17 NOTE — PROGRESS NOTE ADULT - SUBJECTIVE AND OBJECTIVE BOX
Ortho Note    Pt still reports numbness in R foot and LLE sciatica pain. Otherwise, tolerating regular diet, ambulating and have a BM overnight.   Denies CP, SOB, N/V    Vital Signs Last 24 Hrs  T(C): 36.3 (11-17-19 @ 08:50), Max: 36.4 (11-17-19 @ 04:28)  T(F): 97.3 (11-17-19 @ 08:50), Max: 97.6 (11-17-19 @ 04:28)  HR: 60 (11-17-19 @ 08:50) (44 - 60)  BP: 113/71 (11-17-19 @ 08:50) (105/65 - 113/71)  BP(mean): --  RR: 17 (11-17-19 @ 08:50) (16 - 17)  SpO2: 97% (11-17-19 @ 08:50) (94% - 97%)  AVSS    General: Pt Alert and oriented, NAD  Abd and back prineo DSG C/D/I  Sensation decreased in right foot compared to left  Motor strength decreased in right foot compared to left  2+ DP pulse BL LE  toes wwp, BCR                          14.7   17.97 )-----------( 190      ( 16 Nov 2019 10:31 )             44.6   16 Nov 2019 10:31    140    |  106    |  16     ----------------------------<  95     4.1     |  23     |  0.82           A/P: 54yMale s/p L4-S1 ALIF and perc PSF L4-S1 11/13  - Stable  - Pain Control  - DVT ppx: SCDs  - PT, WBS: WBAT  - cont reg diet  - f/u MRI C/T/L spine (metal subtraction for L spine)  - dispo pending    Ortho Pager [phone]

## 2019-11-17 NOTE — PROGRESS NOTE ADULT - SUBJECTIVE AND OBJECTIVE BOX
Patient was seen and examined at bedside. No acute event overnight. Pt complaints of decreased sensation and tiggling on his RIGHT leg when lying and when walking - same as he had before the surgery. +BM x 2 today. Tolerating diet well.      Allergies    No Known Allergies    Intolerances        Vital Signs Last 24 Hrs  T(C): 36.9 (17 Nov 2019 17:09), Max: 37.1 (16 Nov 2019 20:23)  T(F): 98.5 (17 Nov 2019 17:09), Max: 98.8 (16 Nov 2019 20:23)  HR: 88 (17 Nov 2019 17:09) (44 - 105)  BP: 119/78 (17 Nov 2019 17:09) (105/65 - 119/78)  BP(mean): --  RR: 16 (17 Nov 2019 17:09) (15 - 17)  SpO2: 96% (17 Nov 2019 17:09) (94% - 98%)  I&O's Summary    16 Nov 2019 07:01  -  17 Nov 2019 07:00  --------------------------------------------------------  IN: 0 mL / OUT: 400 mL / NET: -400 mL    17 Nov 2019 07:01  -  17 Nov 2019 19:30  --------------------------------------------------------  IN: 1080 mL / OUT: 0 mL / NET: 1080 mL        Physical Exam:  General: NAD  Pulmonary: Nonlabored breathing, no respiratory distress  Cardiovascular: NSR  Abdominal: soft, NT/ND, incision clean/dry without any evidence of hematoma or inflammation  Extremities: WWP, normal strength, no clubbing/cyanosis/edema, no calf or thigh tenderness.   Neuro: A/O x3, decreased sensation on R foot  Pulses: palpable distal pulses      LABS:                        14.7   17.97 )-----------( 190      ( 16 Nov 2019 10:31 )             44.6     11-16    140  |  106  |  16  ----------------------------<  95  4.1   |  23  |  0.82    Ca    8.9      16 Nov 2019 10:31          Radiology and Additional Studies:

## 2019-11-18 LAB
ANION GAP SERPL CALC-SCNC: 9 MMOL/L — SIGNIFICANT CHANGE UP (ref 5–17)
BUN SERPL-MCNC: 18 MG/DL — SIGNIFICANT CHANGE UP (ref 7–23)
CALCIUM SERPL-MCNC: 8.5 MG/DL — SIGNIFICANT CHANGE UP (ref 8.4–10.5)
CHLORIDE SERPL-SCNC: 104 MMOL/L — SIGNIFICANT CHANGE UP (ref 96–108)
CO2 SERPL-SCNC: 27 MMOL/L — SIGNIFICANT CHANGE UP (ref 22–31)
CREAT SERPL-MCNC: 1.06 MG/DL — SIGNIFICANT CHANGE UP (ref 0.5–1.3)
GLUCOSE SERPL-MCNC: 104 MG/DL — HIGH (ref 70–99)
HCT VFR BLD CALC: 45.2 % — SIGNIFICANT CHANGE UP (ref 39–50)
HGB BLD-MCNC: 14.2 G/DL — SIGNIFICANT CHANGE UP (ref 13–17)
MCHC RBC-ENTMCNC: 27.8 PG — SIGNIFICANT CHANGE UP (ref 27–34)
MCHC RBC-ENTMCNC: 31.4 GM/DL — LOW (ref 32–36)
MCV RBC AUTO: 88.6 FL — SIGNIFICANT CHANGE UP (ref 80–100)
NRBC # BLD: 0 /100 WBCS — SIGNIFICANT CHANGE UP (ref 0–0)
PLATELET # BLD AUTO: 197 K/UL — SIGNIFICANT CHANGE UP (ref 150–400)
POTASSIUM SERPL-MCNC: 4.1 MMOL/L — SIGNIFICANT CHANGE UP (ref 3.5–5.3)
POTASSIUM SERPL-SCNC: 4.1 MMOL/L — SIGNIFICANT CHANGE UP (ref 3.5–5.3)
RBC # BLD: 5.1 M/UL — SIGNIFICANT CHANGE UP (ref 4.2–5.8)
RBC # FLD: 13.9 % — SIGNIFICANT CHANGE UP (ref 10.3–14.5)
SODIUM SERPL-SCNC: 140 MMOL/L — SIGNIFICANT CHANGE UP (ref 135–145)
WBC # BLD: 12.88 K/UL — HIGH (ref 3.8–10.5)
WBC # FLD AUTO: 12.88 K/UL — HIGH (ref 3.8–10.5)

## 2019-11-18 PROCEDURE — 99233 SBSQ HOSP IP/OBS HIGH 50: CPT

## 2019-11-18 PROCEDURE — 93970 EXTREMITY STUDY: CPT | Mod: 26

## 2019-11-18 RX ORDER — CYCLOBENZAPRINE HYDROCHLORIDE 10 MG/1
1 TABLET, FILM COATED ORAL
Qty: 21 | Refills: 0
Start: 2019-11-18 | End: 2019-11-24

## 2019-11-18 RX ADMIN — OXYCODONE HYDROCHLORIDE 10 MILLIGRAM(S): 5 TABLET ORAL at 21:40

## 2019-11-18 RX ADMIN — HYDROMORPHONE HYDROCHLORIDE 0.5 MILLIGRAM(S): 2 INJECTION INTRAMUSCULAR; INTRAVENOUS; SUBCUTANEOUS at 22:07

## 2019-11-18 RX ADMIN — HYDROMORPHONE HYDROCHLORIDE 0.5 MILLIGRAM(S): 2 INJECTION INTRAMUSCULAR; INTRAVENOUS; SUBCUTANEOUS at 18:05

## 2019-11-18 RX ADMIN — HYDROMORPHONE HYDROCHLORIDE 0.5 MILLIGRAM(S): 2 INJECTION INTRAMUSCULAR; INTRAVENOUS; SUBCUTANEOUS at 12:15

## 2019-11-18 RX ADMIN — HYDROMORPHONE HYDROCHLORIDE 0.5 MILLIGRAM(S): 2 INJECTION INTRAMUSCULAR; INTRAVENOUS; SUBCUTANEOUS at 01:20

## 2019-11-18 RX ADMIN — OXYCODONE HYDROCHLORIDE 10 MILLIGRAM(S): 5 TABLET ORAL at 00:15

## 2019-11-18 RX ADMIN — Medication 975 MILLIGRAM(S): at 22:07

## 2019-11-18 RX ADMIN — Medication 975 MILLIGRAM(S): at 23:00

## 2019-11-18 RX ADMIN — Medication 975 MILLIGRAM(S): at 14:27

## 2019-11-18 RX ADMIN — Medication 975 MILLIGRAM(S): at 05:09

## 2019-11-18 RX ADMIN — HYDROMORPHONE HYDROCHLORIDE 0.5 MILLIGRAM(S): 2 INJECTION INTRAMUSCULAR; INTRAVENOUS; SUBCUTANEOUS at 22:30

## 2019-11-18 RX ADMIN — OXYCODONE HYDROCHLORIDE 10 MILLIGRAM(S): 5 TABLET ORAL at 03:04

## 2019-11-18 RX ADMIN — OXYCODONE HYDROCHLORIDE 10 MILLIGRAM(S): 5 TABLET ORAL at 10:43

## 2019-11-18 RX ADMIN — HYDROMORPHONE HYDROCHLORIDE 0.5 MILLIGRAM(S): 2 INJECTION INTRAMUSCULAR; INTRAVENOUS; SUBCUTANEOUS at 18:45

## 2019-11-18 RX ADMIN — OXYCODONE HYDROCHLORIDE 10 MILLIGRAM(S): 5 TABLET ORAL at 15:25

## 2019-11-18 RX ADMIN — OXYCODONE HYDROCHLORIDE 10 MILLIGRAM(S): 5 TABLET ORAL at 20:40

## 2019-11-18 RX ADMIN — HYDROMORPHONE HYDROCHLORIDE 0.5 MILLIGRAM(S): 2 INJECTION INTRAMUSCULAR; INTRAVENOUS; SUBCUTANEOUS at 11:40

## 2019-11-18 RX ADMIN — HYDROMORPHONE HYDROCHLORIDE 0.5 MILLIGRAM(S): 2 INJECTION INTRAMUSCULAR; INTRAVENOUS; SUBCUTANEOUS at 05:09

## 2019-11-18 RX ADMIN — CYCLOBENZAPRINE HYDROCHLORIDE 5 MILLIGRAM(S): 10 TABLET, FILM COATED ORAL at 05:09

## 2019-11-18 RX ADMIN — HYDROMORPHONE HYDROCHLORIDE 0.5 MILLIGRAM(S): 2 INJECTION INTRAMUSCULAR; INTRAVENOUS; SUBCUTANEOUS at 00:52

## 2019-11-18 RX ADMIN — OXYCODONE HYDROCHLORIDE 10 MILLIGRAM(S): 5 TABLET ORAL at 03:30

## 2019-11-18 RX ADMIN — POLYETHYLENE GLYCOL 3350 17 GRAM(S): 17 POWDER, FOR SOLUTION ORAL at 11:44

## 2019-11-18 RX ADMIN — OXYCODONE HYDROCHLORIDE 10 MILLIGRAM(S): 5 TABLET ORAL at 09:43

## 2019-11-18 RX ADMIN — Medication 975 MILLIGRAM(S): at 05:08

## 2019-11-18 RX ADMIN — OXYCODONE HYDROCHLORIDE 10 MILLIGRAM(S): 5 TABLET ORAL at 14:27

## 2019-11-18 RX ADMIN — CYCLOBENZAPRINE HYDROCHLORIDE 5 MILLIGRAM(S): 10 TABLET, FILM COATED ORAL at 16:21

## 2019-11-18 RX ADMIN — HYDROMORPHONE HYDROCHLORIDE 0.5 MILLIGRAM(S): 2 INJECTION INTRAMUSCULAR; INTRAVENOUS; SUBCUTANEOUS at 05:30

## 2019-11-18 NOTE — PROGRESS NOTE ADULT - SUBJECTIVE AND OBJECTIVE BOX
NEUROLOGY CONSULT PROGRESS NOTE    INTERVAL HISTORY:  Reports right leg with pain and numbness in right foot. States in his calf area there is a fullness that is painful that has developed over the weekend.     REVIEW OF SYSTEMS:  As per HPI, otherwise negative for Constitutional, Eyes, Ears/Nose/Mouth/Throat, Neck, Cardiovascular, Respiratory, Gastrointestinal, Genitourinary, Skin, Endocrine, Musculoskeletal, Psychiatric, and Hematologic/Lymphatic.    MEDICATIONS:  acetaminophen   Tablet .. 975 milliGRAM(s) Oral every 8 hours  aluminum hydroxide/magnesium hydroxide/simethicone Suspension 30 milliLiter(s) Oral four times a day PRN  bisacodyl Suppository 10 milliGRAM(s) Rectal daily PRN  BUpivacaine liposome 1.3% Injectable (no eMAR) 20 milliLiter(s) Local Injection once  cyclobenzaprine 5 milliGRAM(s) Oral every 8 hours PRN  HYDROmorphone  Injectable 0.5 milliGRAM(s) IV Push every 15 minutes  HYDROmorphone  Injectable 0.5 milliGRAM(s) IV Push every 4 hours PRN  influenza   Vaccine 0.5 milliLiter(s) IntraMuscular once  lactulose Syrup 10 Gram(s) Oral two times a day PRN  magnesium hydroxide Suspension 30 milliLiter(s) Oral daily PRN  ondansetron Injectable 4 milliGRAM(s) IV Push every 6 hours PRN  oxyCODONE    IR 5 milliGRAM(s) Oral every 4 hours PRN  oxyCODONE    IR 10 milliGRAM(s) Oral every 4 hours PRN  polyethylene glycol 3350 17 Gram(s) Oral daily  senna 2 Tablet(s) Oral at bedtime  zaleplon 5 milliGRAM(s) Oral at bedtime PRN    VITAL SIGNS:  Vital Signs Last 24 Hrs  T(C): 36.7 (18 Nov 2019 16:36), Max: 37.1 (18 Nov 2019 13:33)  T(F): 98.1 (18 Nov 2019 16:36), Max: 98.8 (18 Nov 2019 13:33)  HR: 99 (18 Nov 2019 16:36) (62 - 99)  BP: 131/75 (18 Nov 2019 16:36) (104/69 - 131/75)  BP(mean): --  RR: 17 (18 Nov 2019 16:36) (16 - 18)  SpO2: 95% (18 Nov 2019 16:36) (94% - 98%)    Neurologic:  - Mental Status:  AAOx3      - Cranial Nerves II-XII:    II:  Visual acuity is 20/20 bilaterally; Visual fields are full to confrontation;  Pupils are equal, round, and reactive to light.  III, IV, VI:  Extraocular movements are intact without nystagmus.  V:  Facial sensation is intact in the V1-V3 distribution bilaterally.  VII:  Face is symmetric with normal eye closure and smile  VIII:  Hearing is intact to finger rub.  IX, X:  Uvula is midline and soft palate rises symmetrically  XI:  Head turning and shoulder shrug are intact.  XII:  Tongue protrudes in the midline.    - Motor:  Right Upper Extremity strength 3/5, left upper extremity 3/5, there is no pronator drift, right lower extremity 1/5 on hip flexion and extension, 3/5 strength left lower extremity on flexion and extension, 4/5 plantarflexion bilaterally, there is weak dorsiflexion of right foot, noted right foot drop   - Reflexes:  2+ and symmetric at the biceps, triceps, brachioradialis, knees. 1+ ankle reflexes bilaterally, Plantar responses flexor.  - Sensory: There is decreased sensation in the lateral portion of right tibia, vibration and sensation intact in lower extremities and upper extremities   - Coordination:  Finger-nose-finger. Unable to perform heel to foot.  Rapid alternating hand movements intact.  - Gait:   Deferred    LABS:                          14.2   12.88 )-----------( 197      ( 18 Nov 2019 07:15 )             45.2     11-18    140  |  104  |  18  ----------------------------<  104<H>  4.1   |  27  |  1.06    Ca    8.5      18 Nov 2019 07:15            RADIOLOGY & ADDITIONAL STUDIES:      IMPRESSION & RECOMMENDATIONS: NEUROLOGY CONSULT PROGRESS NOTE    INTERVAL HISTORY:  Reports right leg with pain and numbness in right foot. States in his calf area there is a fullness that is painful that has developed over the weekend.     REVIEW OF SYSTEMS:  As per HPI, otherwise negative for Constitutional, Eyes, Ears/Nose/Mouth/Throat, Neck, Cardiovascular, Respiratory, Gastrointestinal, Genitourinary, Skin, Endocrine, Musculoskeletal, Psychiatric, and Hematologic/Lymphatic.    MEDICATIONS:  acetaminophen   Tablet .. 975 milliGRAM(s) Oral every 8 hours  aluminum hydroxide/magnesium hydroxide/simethicone Suspension 30 milliLiter(s) Oral four times a day PRN  bisacodyl Suppository 10 milliGRAM(s) Rectal daily PRN  BUpivacaine liposome 1.3% Injectable (no eMAR) 20 milliLiter(s) Local Injection once  cyclobenzaprine 5 milliGRAM(s) Oral every 8 hours PRN  HYDROmorphone  Injectable 0.5 milliGRAM(s) IV Push every 15 minutes  HYDROmorphone  Injectable 0.5 milliGRAM(s) IV Push every 4 hours PRN  influenza   Vaccine 0.5 milliLiter(s) IntraMuscular once  lactulose Syrup 10 Gram(s) Oral two times a day PRN  magnesium hydroxide Suspension 30 milliLiter(s) Oral daily PRN  ondansetron Injectable 4 milliGRAM(s) IV Push every 6 hours PRN  oxyCODONE    IR 5 milliGRAM(s) Oral every 4 hours PRN  oxyCODONE    IR 10 milliGRAM(s) Oral every 4 hours PRN  polyethylene glycol 3350 17 Gram(s) Oral daily  senna 2 Tablet(s) Oral at bedtime  zaleplon 5 milliGRAM(s) Oral at bedtime PRN    VITAL SIGNS:  Vital Signs Last 24 Hrs  T(C): 36.7 (18 Nov 2019 16:36), Max: 37.1 (18 Nov 2019 13:33)  T(F): 98.1 (18 Nov 2019 16:36), Max: 98.8 (18 Nov 2019 13:33)  HR: 99 (18 Nov 2019 16:36) (62 - 99)  BP: 131/75 (18 Nov 2019 16:36) (104/69 - 131/75)  BP(mean): --  RR: 17 (18 Nov 2019 16:36) (16 - 18)  SpO2: 95% (18 Nov 2019 16:36) (94% - 98%)    Neurologic:  - Mental Status:  AAOx3      - Cranial Nerves II-XII:    II:  Visual acuity is 20/20 bilaterally; Visual fields are full to confrontation;  Pupils are equal, round, and reactive to light.  III, IV, VI:  Extraocular movements are intact without nystagmus.  V:  Facial sensation is intact in the V1-V3 distribution bilaterally.  VII:  Face is symmetric with normal eye closure and smile  VIII:  Hearing is intact to finger rub.  IX, X:  Uvula is midline and soft palate rises symmetrically  XI:  Head turning and shoulder shrug are intact.  XII:  Tongue protrudes in the midline.    - Motor:  see att note below.  - Reflexes:  2+ and symmetric at the biceps, triceps, brachioradialis, knees. 1+ ankle reflexes bilaterally, Plantar responses flexor.  - Sensory: There is decreased sensation in the lateral portion of right tibia, vibration and sensation intact in lower extremities and upper extremities   - Coordination:  Finger-nose-finger. Unable to perform heel to foot.  Rapid alternating hand movements intact.  - Gait:   Deferred    LABS:                          14.2   12.88 )-----------( 197      ( 18 Nov 2019 07:15 )             45.2     11-18    140  |  104  |  18  ----------------------------<  104<H>  4.1   |  27  |  1.06    Ca    8.5      18 Nov 2019 07:15            RADIOLOGY & ADDITIONAL STUDIES:      IMPRESSION & RECOMMENDATIONS:

## 2019-11-18 NOTE — PROGRESS NOTE ADULT - SUBJECTIVE AND OBJECTIVE BOX
ORTHO NOTE    [x ] Pt seen/examined with Dr. Montero  [ ] Pt without any complaints/in NAD.    [ x] Pt complains of: right thigh and right foot "numbness and heaviness."  He reports intermittent right calf pain that is not present on today's exam      ROS: [ ] Fever  [ ] Chills  [ ] CP [ ] SOB [ ] Dysnea  [ ] Palpitations [ ] Cough [ ] N/V/C/D [ ] Paresthia [ ] Other     [x ] ROS  otherwise negative    .    PHYSICAL EXAM:    Vital Signs Last 24 Hrs  T(C): 37.1 (18 Nov 2019 13:33), Max: 37.1 (18 Nov 2019 13:33)  T(F): 98.8 (18 Nov 2019 13:33), Max: 98.8 (18 Nov 2019 13:33)  HR: 81 (18 Nov 2019 13:33) (62 - 88)  BP: 123/77 (18 Nov 2019 13:33) (104/69 - 123/77)  BP(mean): --  RR: 18 (18 Nov 2019 13:33) (16 - 18)  SpO2: 94% (18 Nov 2019 13:33) (94% - 98%)    I&O's Detail    17 Nov 2019 07:01  -  18 Nov 2019 07:00  --------------------------------------------------------  IN:    Oral Fluid: 1480 mL  Total IN: 1480 mL    OUT:  Total OUT: 0 mL    Total NET: 1480 mL           CAPILLARY BLOOD GLUCOSE                      Neuro: AAOX3, scattered and often racing thought patterns    Lungs: nonlabored, Spo2 wnl on RA    CV: nsr, HR stable    ABD: Abdomen: Prineo Dressing C/D/I; Abd NT/ND    Ext:  Back: Prineo Dressing CDI x2  Motor:  LLE- Hip Flex/Knee Ext 5/5; TA/EHL/GS 4+/5 strength  RLE- Hip Flex/Knee Ext 5/5; TA/EHL/GS 3/5 strength  Sensory:  LLE- Diminished sensation corresponding to L4-S1 dermatomes better than RLE that is improved since yesterday; Otherwise SILT L2-3 dermatomes   RLE- Diminished sensation corresponding to L4-S1 dermatomes worse than LLE that is worsened from prior exams; Otherwise SILT L2-3 dermatomes  Vascular:  b/l LE calves soft, nontender.  No erythema/edema noted.  Feet warm, cap refill less than 2 sec, pedal pulses 2+    LABS                        14.2   12.88 )-----------( 197      ( 18 Nov 2019 07:15 )             45.2                                11-18    140  |  104  |  18  ----------------------------<  104<H>  4.1   |  27  |  1.06    Ca    8.5      18 Nov 2019 07:15        [ ] Other Labs  [ ] None ordered            Please check or [place] when present:  •  Heart Failure:    [ ] Acute        [ ]  Acute on Chronic        [ ] Chronic         [ ] Diastolic     [ ]  Combined    •  ARNOLD:     [ ] ATN        [ ]  Renal medullary necrosis       [ ]  Renal cortical necrosis                  [ ] Other pathological Lesion:  •  CKD:  [ ] Stage I   [ ] Stage II  [ ] Stage III    [ ]Stage IV   [ ]  CKD V   [ ]  Other/Unspecified:    •  Abdominal Nutritional Status:   [ ] Malnutrition-See Nutrition note    [ ] Cachexia   [ ]  Other        [ ] Supplement ordered:            [ ] Morbid Obesity: BMI >=40         ASSESSMENT/PLAN:      STATUS POST: ALIF/PSF L4-S1  Dr. Montero recommends bilateral LE doppler to r/o dvt given c/o intermittent severe right calf pain.  Dr. Montero recommends outpatient follow up care for cervical spine disc herniation  Dr. Montero appreciates continued neurology recommendations  Patient ambulated with a steady gait.  Encouraged to participate in stair negotiation with physical therapy today  pain control  bowel regimen    CONTINUE:          [ ] PT- wbat, spinal precautions    [ ] DVT PPX- scd    [ ] Pain Mgt- po med    [ ] Dispo plan- home

## 2019-11-18 NOTE — PROGRESS NOTE ADULT - ASSESSMENT
54 year old male s/p ASF/PSF L2-S1    Tolerating diet, continue bowel regimen  Continue SCDs, DVT ppx once ok with primary team  No clinical signs of DVT at this time  Care per primary team  Call [phone] with questions

## 2019-11-18 NOTE — PROGRESS NOTE ADULT - ATTENDING COMMENTS
I was physically present for the key portions of the evaluation and managemnent (E/M) service provided.  I agree with the above history, physical, and plan which I have reviewed and edited where appropriate, with the exceptions as per my note.    pt seen and examined on 11/18/19.    Imaging reviewed -  I don't appreciate clear cervical cord signal change and central canal stenosis is not likely the etiology of pt's lateral R leg pain and foot drop.  IMPRESSION:   1. Status post anterior lumbar interbody fusion and discectomy at L4-5   and L5-S1 levels and posterior fusion from L4-S1. No residual or   recurrent discs at these levels. No lumbar spinal canal stenosis.    2. Disc herniations at C3-C4, C4-C5, and C5-C6 levels in which both the   C4-C5 and C5-C6 disc herniations are seen in association with central   canal stenosis, and mild cord impingement with resultant abnormal T2   signal in the cervical spinal cord likely gliosis and/or myelomalacia.    3. Unremarkable thoracic spine exam.    neuro exam  perrl, eomi, face symm, tup ml, no nyst, vff.  5/5 in UEs and LEs throughout except 4+/5 R DF.  sens loss over lateral aspect of lower R leg and dorsum of foot and underside of toes. Otherwise intact.  dtrs symmetric and present, including good BL AJs. normal reflexes. toes down.  coord intact throughout    AP: New onset R lat leg pain, R foot drop, and sensory loss of RLE and foot. Likely peripheral nerve injury. ? peroneal neuropathy given dermatomal distribution of sensory change and foot drop. Counseled on generally good prognosis with peripheral nerve injuries. Other potential etiology is root trauma related to surgery but is felt less likely given no significant pathology seen on imaging. Finally, a partial sciatic neuropathy could also present with similar distribution sensory change and foot drop.    - consider gabapentin for pain  - PT  - f/u with Dr. Harris for EMG as outpatient if sx persist.

## 2019-11-18 NOTE — PROGRESS NOTE ADULT - ASSESSMENT
53yo M PMHx lumbar stenosis s/p anterior lumbar interbody fusion (ALIF) and posterior spinal fusion (PSF) [11/13] complaining worsening subacute on acute right leg weakness and right foot numbness.     -Based on history, there seems to be a subacute right leg weakness and right foot numbness present before surgery. However, symptoms have worsened after 11/13 suggesting that there may be damage to nerves involving leg.  Given cervical, thoracic and lumbar spine with disc herniations but not impressive to cause current complaints. Based on exam, there may be an involvement of the peroneal nerve as pt complained of decreased sensation of right anterior tibia in addition to decreased dorsiflexion strength which is likely due to a peroneal nerve injury vs injury due to recent surgery.     -Consider gabapentin 300mg TID for peroneal nerve impingement   -Can follow with Dr. Harris (neurology) for EMG testing of left leg after discharge  -Neurology will sign off at this time. Please reconsult as needed. 55yo M PMHx lumbar stenosis s/p anterior lumbar interbody fusion (ALIF) and posterior spinal fusion (PSF) [11/13] complaining worsening subacute on acute right leg weakness and right foot numbness.     Likely peripheral nerve injury. ? peroneal neuropathy given dermatomal distribution of sensory change and foot drop. Counseled on generally good prognosis with peripheral nerve injuries. Other potential etiology is root trauma related to surgery but is felt less likely given no significant pathology seen on imaging. Finally, a partial sciatic neuropathy could also present with similar distribution sensory change and foot drop.    - PT  -Consider gabapentin 300mg TID for peroneal nerve impingement   - follow with Dr. Harris (neurology) for EMG testing of left leg after discharge if sx do not improve.  -Neurology will sign off at this time. Please reconsult as needed.

## 2019-11-18 NOTE — PROGRESS NOTE ADULT - SUBJECTIVE AND OBJECTIVE BOX
Vascular Surgery Consult - Progress Note    Subjective:  Reports continued numbness on dorsal aspect of R foot, but states he felt this prior to surgery. Pain is better controlled. Tolerating diet without N/V. Passing flatus, BMs but takes a lot of effort. Denies CP/SOB.    Vital Signs Last 24 Hrs  T(C): 37.1 (18 Nov 2019 13:33), Max: 37.1 (18 Nov 2019 13:33)  T(F): 98.8 (18 Nov 2019 13:33), Max: 98.8 (18 Nov 2019 13:33)  HR: 81 (18 Nov 2019 13:33) (62 - 88)  BP: 123/77 (18 Nov 2019 13:33) (104/69 - 123/77)  BP(mean): --  RR: 18 (18 Nov 2019 13:33) (16 - 18)  SpO2: 94% (18 Nov 2019 13:33) (94% - 98%)    I&O's Summary  17 Nov 2019 07:01  -  18 Nov 2019 07:00  --------------------------------------------------------  IN: 1480 mL / OUT: 0 mL / NET: 1480 mL    Physical Exam:  General: NAD, resting comfortably  Pulmonary: normal resp effort  Abdominal: soft, appropriately tender, non-distended; incision c/d/i  Extremities: WWP, normal strength, no clubbing/cyanosis/edema; numbness on dorsal surface of R foot; 2+ DP b/l  Neuro: A/O x 3    LABS:                     14.2   12.88 )-----------( 197      ( 18 Nov 2019 07:15 )             45.2     11-18  140  |  104  |  18  ----------------------------<  104<H>  4.1   |  27  |  1.06    Ca    8.5      18 Nov 2019 07:15

## 2019-11-18 NOTE — PROGRESS NOTE ADULT - ASSESSMENT
A/P: 54y Male s/p L4-S1 ALIF/PSF on 11/13    - Stable  - Pain control  - Nausea control/Bowel regiment  - Patient tolerating PO diet well  - Home meds  - PT: home  - WBAT  - Passed TOV on 11/14  - DVT ppx: SCDs  - Pt still w complaints of RLE L4-S1 numbness/weakness worsened since OR -- CT and MRI studies grossly unremarkable for structural lesions  - Appreciate neurology and vascular recommendations for possible etiology of worsening RLE symptoms  - MRI C spine shows worsening stenosis -- possible cause of RLE symptoms?    Ortho Pager [phone]

## 2019-11-18 NOTE — PROGRESS NOTE ADULT - SUBJECTIVE AND OBJECTIVE BOX
Orthopaedic Spine Resident Note    S:  No acute overnight events.  Pain well controlled.    No fevers/chills/shortness of breath/chest pain.  Patient continuing to complain of RLE numbness/weakness involving dorsal/plantar surfaces of the foot that he states is worsening    O:  Vital Signs Last 24 Hrs  T(C): 36.7 (18 Nov 2019 09:01), Max: 36.9 (17 Nov 2019 17:09)  T(F): 98 (18 Nov 2019 09:01), Max: 98.5 (17 Nov 2019 17:09)  HR: 62 (18 Nov 2019 09:01) (62 - 105)  BP: 104/69 (18 Nov 2019 09:01) (104/69 - 119/78)  BP(mean): --  RR: 16 (18 Nov 2019 09:01) (16 - 17)  SpO2: 96% (18 Nov 2019 09:01) (96% - 98%)    General: Well-appearing adult Male lying supine; NAD; A&Ox3  Abdomen: Prineo Dressing C/D/I; Abd NT/ND  Back: Prineo Dressing CDI x2  Motor:  LLE- Hip Flex/Knee Ext 5/5; TA/EHL/GS 4+/5 strength  RLE- Hip Flex/Knee Ext 5/5; TA/EHL/GS 4/5 strength  Sensory:  LLE- Diminished sensation corresponding to L4-S1 dermatomes better than RLE that is improved since yesterday; Otherwise SILT L2-3 dermatomes   RLE- Diminished sensation corresponding to L4-S1 dermatomes worse than LLE that is worsened from prior exams; Otherwise SILT L2-3 dermatomes  Vascular:  B/l lower extremities without significant swelling/warmth/erythema; Not concerning for presence of DVT at this time  Feet WWP; DP 2+ bilaterally; Cap refill < 2 sec          Labs:                        14.2   12.88 )-----------( 197      ( 18 Nov 2019 07:15 )             45.2       11-18    140  |  104  |  18  ----------------------------<  104<H>  4.1   |  27  |  1.06    Ca    8.5      18 Nov 2019 07:15

## 2019-11-18 NOTE — DIETITIAN INITIAL EVALUATION ADULT. - ENERGY NEEDS
Height: 66" Weight: 197lbs/ 89.4kg (11/13), IBW 142lbs+/-10%, %.73%, BMI 31.8 kg/m2  IBW used for calculations as pt >120% of IBW. Needs adjusted for wound healing and obesity.
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
## 2019-11-19 ENCOUNTER — TRANSCRIPTION ENCOUNTER (OUTPATIENT)
Age: 54
End: 2019-11-19

## 2019-11-19 VITALS
DIASTOLIC BLOOD PRESSURE: 71 MMHG | RESPIRATION RATE: 18 BRPM | TEMPERATURE: 98 F | SYSTOLIC BLOOD PRESSURE: 99 MMHG | HEART RATE: 98 BPM | OXYGEN SATURATION: 95 %

## 2019-11-19 LAB
ANION GAP SERPL CALC-SCNC: 9 MMOL/L — SIGNIFICANT CHANGE UP (ref 5–17)
BUN SERPL-MCNC: 17 MG/DL — SIGNIFICANT CHANGE UP (ref 7–23)
CALCIUM SERPL-MCNC: 8.8 MG/DL — SIGNIFICANT CHANGE UP (ref 8.4–10.5)
CHLORIDE SERPL-SCNC: 102 MMOL/L — SIGNIFICANT CHANGE UP (ref 96–108)
CO2 SERPL-SCNC: 26 MMOL/L — SIGNIFICANT CHANGE UP (ref 22–31)
CREAT SERPL-MCNC: 1.08 MG/DL — SIGNIFICANT CHANGE UP (ref 0.5–1.3)
GLUCOSE SERPL-MCNC: 111 MG/DL — HIGH (ref 70–99)
HCT VFR BLD CALC: 42.3 % — SIGNIFICANT CHANGE UP (ref 39–50)
HGB BLD-MCNC: 13.9 G/DL — SIGNIFICANT CHANGE UP (ref 13–17)
MCHC RBC-ENTMCNC: 28.7 PG — SIGNIFICANT CHANGE UP (ref 27–34)
MCHC RBC-ENTMCNC: 32.9 GM/DL — SIGNIFICANT CHANGE UP (ref 32–36)
MCV RBC AUTO: 87.2 FL — SIGNIFICANT CHANGE UP (ref 80–100)
NRBC # BLD: 0 /100 WBCS — SIGNIFICANT CHANGE UP (ref 0–0)
PLATELET # BLD AUTO: 185 K/UL — SIGNIFICANT CHANGE UP (ref 150–400)
POTASSIUM SERPL-MCNC: 4.5 MMOL/L — SIGNIFICANT CHANGE UP (ref 3.5–5.3)
POTASSIUM SERPL-SCNC: 4.5 MMOL/L — SIGNIFICANT CHANGE UP (ref 3.5–5.3)
RBC # BLD: 4.85 M/UL — SIGNIFICANT CHANGE UP (ref 4.2–5.8)
RBC # FLD: 14.1 % — SIGNIFICANT CHANGE UP (ref 10.3–14.5)
SODIUM SERPL-SCNC: 137 MMOL/L — SIGNIFICANT CHANGE UP (ref 135–145)
WBC # BLD: 10.51 K/UL — HIGH (ref 3.8–10.5)
WBC # FLD AUTO: 10.51 K/UL — HIGH (ref 3.8–10.5)

## 2019-11-19 PROCEDURE — 99231 SBSQ HOSP IP/OBS SF/LOW 25: CPT

## 2019-11-19 RX ORDER — GABAPENTIN 400 MG/1
300 CAPSULE ORAL THREE TIMES A DAY
Refills: 0 | Status: DISCONTINUED | OUTPATIENT
Start: 2019-11-19 | End: 2019-11-19

## 2019-11-19 RX ORDER — GABAPENTIN 400 MG/1
1 CAPSULE ORAL
Qty: 42 | Refills: 0
Start: 2019-11-19 | End: 2019-12-02

## 2019-11-19 RX ADMIN — OXYCODONE HYDROCHLORIDE 10 MILLIGRAM(S): 5 TABLET ORAL at 14:35

## 2019-11-19 RX ADMIN — HYDROMORPHONE HYDROCHLORIDE 0.5 MILLIGRAM(S): 2 INJECTION INTRAMUSCULAR; INTRAVENOUS; SUBCUTANEOUS at 04:32

## 2019-11-19 RX ADMIN — OXYCODONE HYDROCHLORIDE 10 MILLIGRAM(S): 5 TABLET ORAL at 00:55

## 2019-11-19 RX ADMIN — HYDROMORPHONE HYDROCHLORIDE 0.5 MILLIGRAM(S): 2 INJECTION INTRAMUSCULAR; INTRAVENOUS; SUBCUTANEOUS at 13:27

## 2019-11-19 RX ADMIN — HYDROMORPHONE HYDROCHLORIDE 0.5 MILLIGRAM(S): 2 INJECTION INTRAMUSCULAR; INTRAVENOUS; SUBCUTANEOUS at 05:00

## 2019-11-19 RX ADMIN — OXYCODONE HYDROCHLORIDE 10 MILLIGRAM(S): 5 TABLET ORAL at 01:30

## 2019-11-19 RX ADMIN — CYCLOBENZAPRINE HYDROCHLORIDE 5 MILLIGRAM(S): 10 TABLET, FILM COATED ORAL at 15:42

## 2019-11-19 RX ADMIN — HYDROMORPHONE HYDROCHLORIDE 0.5 MILLIGRAM(S): 2 INJECTION INTRAMUSCULAR; INTRAVENOUS; SUBCUTANEOUS at 10:20

## 2019-11-19 RX ADMIN — CYCLOBENZAPRINE HYDROCHLORIDE 5 MILLIGRAM(S): 10 TABLET, FILM COATED ORAL at 00:21

## 2019-11-19 RX ADMIN — GABAPENTIN 300 MILLIGRAM(S): 400 CAPSULE ORAL at 14:35

## 2019-11-19 RX ADMIN — OXYCODONE HYDROCHLORIDE 10 MILLIGRAM(S): 5 TABLET ORAL at 08:31

## 2019-11-19 RX ADMIN — HYDROMORPHONE HYDROCHLORIDE 0.5 MILLIGRAM(S): 2 INJECTION INTRAMUSCULAR; INTRAVENOUS; SUBCUTANEOUS at 09:46

## 2019-11-19 RX ADMIN — Medication 975 MILLIGRAM(S): at 17:03

## 2019-11-19 RX ADMIN — Medication 975 MILLIGRAM(S): at 09:49

## 2019-11-19 RX ADMIN — HYDROMORPHONE HYDROCHLORIDE 0.5 MILLIGRAM(S): 2 INJECTION INTRAMUSCULAR; INTRAVENOUS; SUBCUTANEOUS at 17:45

## 2019-11-19 NOTE — PROGRESS NOTE ADULT - SUBJECTIVE AND OBJECTIVE BOX
Vascular Surgery Consult - Progress Note    Subjective:  Reports continued R leg numbness on the top of his foot. Pain in his abdomen is slowly improving and patient states he knows it will take time to improve. Denies CP/SOB. Denies N/V. Passing flatus, had BM yesterday.    Vital Signs Last 24 Hrs  T(C): 37.3 (19 Nov 2019 08:21), Max: 37.3 (19 Nov 2019 08:21)  T(F): 99.1 (19 Nov 2019 08:21), Max: 99.1 (19 Nov 2019 08:21)  HR: 76 (19 Nov 2019 08:21) (66 - 99)  BP: 117/78 (19 Nov 2019 08:21) (108/73 - 131/75)  BP(mean): --  RR: 16 (19 Nov 2019 08:21) (16 - 18)  SpO2: 94% (19 Nov 2019 08:21) (94% - 95%)    I&O's Summary  18 Nov 2019 07:01  -  19 Nov 2019 07:00  --------------------------------------------------------  IN: 540 mL / OUT: 0 mL / NET: 540 mL    Physical Exam:  General: NAD, resting comfortably  Pulmonary: normal resp effort  Abdominal: soft, appropriately tender, non-distended; incision with dermabond in place  Extremities: WWP, normal strength, no clubbing/cyanosis/edema; 2+ DP pulses  Neuro: A/O x 3    LABS:                 13.9   10.51 )-----------( 185      ( 19 Nov 2019 06:26 )             42.3     11-19  137  |  102  |  17  ----------------------------<  111<H>  4.5   |  26  |  1.08    Ca    8.8      19 Nov 2019 06:26

## 2019-11-19 NOTE — DISCHARGE NOTE NURSING/CASE MANAGEMENT/SOCIAL WORK - PATIENT PORTAL LINK FT
You can access the FollowMyHealth Patient Portal offered by [facility] by registering at the following website: [email or web address]. By joining [facility]’s FollowMyHealth portal, you will also be able to view your health information using other applications (apps) compatible with our system.

## 2019-11-19 NOTE — PROGRESS NOTE ADULT - SUBJECTIVE AND OBJECTIVE BOX
Orthopaedic Surgery Progress Note    Post-operative day #6 s/p L4-S1 ALIF/PSF    Subjective:     Patient seen and examined. Patient continues to have back pain, but this resolves with PO pain meds. He also has numbness in his right foot that has not improved or resolved.  Denies chest pain, shortness of breath, nausea/vomiting.    Objective:    Vital Signs Last 24 Hrs  T(C): 37.3 (11-19-19 @ 08:21), Max: 37.3 (11-19-19 @ 08:21)  T(F): 99.1 (11-19-19 @ 08:21), Max: 99.1 (11-19-19 @ 08:21)  HR: 76 (11-19-19 @ 08:21) (66 - 76)  BP: 117/78 (11-19-19 @ 08:21) (108/73 - 117/78)  BP(mean): --  RR: 16 (11-19-19 @ 08:21) (16 - 17)  SpO2: 94% (11-19-19 @ 08:21) (94% - 95%)  AVSS    PE:  General: Patient alert and oriented, NAD  Dressing: Clean/dry/intact prineo   Pulses: 2+ DP BLE   Sensation: Decreased sensation to light touch RLE, sensation intact to light touch LLE  Motor: EHL/FHL/TA/GS 5/5 LLE  EHL/TA/GS 4+/5 RLE                          13.9   10.51 )-----------( 185      ( 19 Nov 2019 06:26 )             42.3   19 Nov 2019 06:26    137    |  102    |  17     ----------------------------<  111    4.5     |  26     |  1.08     Ca    8.8        19 Nov 2019 06:26        A/P: 53 yo Male POD#6 s/p ALIF/PSF L4-S1    1. Pain control as needed  2. DVT prophylaxis: SCDs  3. PT, weight-bearing status: WBAT  4. LE Doppler negative for DVT. Appreciate Vascular recs.  5. Appreciate Neurology recs. Will start pt on Gabapentin 300mg TID. Pt will follow up with Dr. Harris outpatient.  6. Dispo: Home this evening    Ortho Pager [phone]

## 2019-11-19 NOTE — PROGRESS NOTE ADULT - SUBJECTIVE AND OBJECTIVE BOX
Orthopaedic Spine Resident Note    **Patient seen and examined earlier this AM**    S:  No acute overnight events.  Pain well controlled.    No fevers/chills/shortness of breath/chest pain.  Patient continuing to complain of RLE numbness/weakness involving dorsal/plantar surfaces of the foot that is unchanged from previous sxs    O:  Vital Signs Last 24 Hrs  T(C): 36.6 (19 Nov 2019 16:26), Max: 37.3 (19 Nov 2019 08:21)  T(F): 97.8 (19 Nov 2019 16:26), Max: 99.2 (19 Nov 2019 13:15)  HR: 98 (19 Nov 2019 16:26) (66 - 99)  BP: 99/71 (19 Nov 2019 16:26) (99/71 - 124/76)  BP(mean): --  RR: 18 (19 Nov 2019 16:26) (16 - 18)  SpO2: 95% (19 Nov 2019 16:26) (94% - 95%)  VSS  General: Well-appearing adult Male lying supine; NAD; A&Ox3  Abdomen: Prineo Dressing C/D/I; Abd NT/ND  Back: Prineo Dressing CDI x2  Motor:  LLE- Hip Flex/Knee Ext 5/5; TA/EHL/GS 4+/5 strength  RLE- Hip Flex/Knee Ext 5/5; TA/EHL/GS 4/5 strength  Sensory:  LLE- Diminished sensation corresponding to L4-S1 dermatomes better than RLE that is stable; Otherwise SILT L2-3 dermatomes   RLE- Diminished sensation corresponding to L4-S1 dermatomes worse than LLE that is stable; Otherwise SILT L2-3 dermatomes  Vascular:  B/l lower extremities without significant swelling/warmth/erythema; Not concerning for presence of DVT at this time  Feet WWP; DP 2+ bilaterally; Cap refill < 2 sec          Labs:                        13.9   10.51 )-----------( 185      ( 19 Nov 2019 06:26 )             42.3       11-19    137  |  102  |  17  ----------------------------<  111<H>  4.5   |  26  |  1.08    Ca    8.8      19 Nov 2019 06:26

## 2019-11-19 NOTE — PROGRESS NOTE ADULT - REASON FOR ADMISSION
back pain

## 2019-11-19 NOTE — PROGRESS NOTE ADULT - ASSESSMENT
54 year old male s/p ASF/PSF L2-S1    Tolerating diet, continue bowel regimen  Continue SCDs, DVT ppx once ok with primary team  Care/dispo per primary team  Call [phone] with questions

## 2019-11-19 NOTE — PROGRESS NOTE ADULT - ASSESSMENT
A/P: 54y Male s/p L4-S1 ALIF/PSF on 11/13    - Stable  - Pain control  - Nausea control/Bowel regiment  - Patient tolerating PO diet well  - Home meds  - PT: home  - WBAT  - Passed TOV on 11/14  - DVT ppx: SCDs  - Pt still w complaints of RLE L4-S1 numbness/weakness worsened since OR -- CT and MRI studies grossly unremarkable for structural lesions  - Neuro and vascular w no recs for interventions at this time  - Given unlikely further intervention at this time, possible d/c today vs tmrw    Ortho Pager [phone]

## 2019-11-20 ENCOUNTER — INBOUND DOCUMENT (OUTPATIENT)
Age: 54
End: 2019-11-20

## 2019-11-21 ENCOUNTER — INPATIENT (INPATIENT)
Facility: HOSPITAL | Age: 54
LOS: 4 days | Discharge: HOME | DRG: 517 | End: 2019-11-26
Attending: ORTHOPAEDIC SURGERY | Admitting: ORTHOPAEDIC SURGERY
Payer: COMMERCIAL

## 2019-11-21 VITALS
HEART RATE: 106 BPM | TEMPERATURE: 98 F | HEIGHT: 65 IN | WEIGHT: 195.99 LBS | SYSTOLIC BLOOD PRESSURE: 120 MMHG | RESPIRATION RATE: 19 BRPM | DIASTOLIC BLOOD PRESSURE: 79 MMHG | OXYGEN SATURATION: 94 %

## 2019-11-21 DIAGNOSIS — Z98.890 OTHER SPECIFIED POSTPROCEDURAL STATES: Chronic | ICD-10-CM

## 2019-11-21 DIAGNOSIS — Z90.49 ACQUIRED ABSENCE OF OTHER SPECIFIED PARTS OF DIGESTIVE TRACT: Chronic | ICD-10-CM

## 2019-11-21 LAB
ALBUMIN SERPL ELPH-MCNC: 3.8 G/DL — SIGNIFICANT CHANGE UP (ref 3.3–5)
ALP SERPL-CCNC: 81 U/L — SIGNIFICANT CHANGE UP (ref 40–120)
ALT FLD-CCNC: 32 U/L — SIGNIFICANT CHANGE UP (ref 10–45)
ANION GAP SERPL CALC-SCNC: 12 MMOL/L — SIGNIFICANT CHANGE UP (ref 5–17)
AST SERPL-CCNC: 19 U/L — SIGNIFICANT CHANGE UP (ref 10–40)
BASOPHILS # BLD AUTO: 0.03 K/UL — SIGNIFICANT CHANGE UP (ref 0–0.2)
BASOPHILS NFR BLD AUTO: 0.2 % — SIGNIFICANT CHANGE UP (ref 0–2)
BILIRUB SERPL-MCNC: 0.6 MG/DL — SIGNIFICANT CHANGE UP (ref 0.2–1.2)
BUN SERPL-MCNC: 14 MG/DL — SIGNIFICANT CHANGE UP (ref 7–23)
CALCIUM SERPL-MCNC: 9.4 MG/DL — SIGNIFICANT CHANGE UP (ref 8.4–10.5)
CHLORIDE SERPL-SCNC: 104 MMOL/L — SIGNIFICANT CHANGE UP (ref 96–108)
CO2 SERPL-SCNC: 25 MMOL/L — SIGNIFICANT CHANGE UP (ref 22–31)
CREAT SERPL-MCNC: 1.01 MG/DL — SIGNIFICANT CHANGE UP (ref 0.5–1.3)
EOSINOPHIL # BLD AUTO: 0.29 K/UL — SIGNIFICANT CHANGE UP (ref 0–0.5)
EOSINOPHIL NFR BLD AUTO: 2.2 % — SIGNIFICANT CHANGE UP (ref 0–6)
EXTRA BLUE TOP TUBE: SIGNIFICANT CHANGE UP
EXTRA SST TUBE: SIGNIFICANT CHANGE UP
GLUCOSE SERPL-MCNC: 135 MG/DL — HIGH (ref 70–99)
HCT VFR BLD CALC: 43.7 % — SIGNIFICANT CHANGE UP (ref 39–50)
HGB BLD-MCNC: 14.3 G/DL — SIGNIFICANT CHANGE UP (ref 13–17)
IMM GRANULOCYTES NFR BLD AUTO: 1.6 % — HIGH (ref 0–1.5)
LACTATE SERPL-SCNC: 1.7 MMOL/L — SIGNIFICANT CHANGE UP (ref 0.5–2)
LYMPHOCYTES # BLD AUTO: 18.8 % — SIGNIFICANT CHANGE UP (ref 13–44)
LYMPHOCYTES # BLD AUTO: 2.44 K/UL — SIGNIFICANT CHANGE UP (ref 1–3.3)
MCHC RBC-ENTMCNC: 28.4 PG — SIGNIFICANT CHANGE UP (ref 27–34)
MCHC RBC-ENTMCNC: 32.7 GM/DL — SIGNIFICANT CHANGE UP (ref 32–36)
MCV RBC AUTO: 86.9 FL — SIGNIFICANT CHANGE UP (ref 80–100)
MONOCYTES # BLD AUTO: 1.15 K/UL — HIGH (ref 0–0.9)
MONOCYTES NFR BLD AUTO: 8.9 % — SIGNIFICANT CHANGE UP (ref 2–14)
NEUTROPHILS # BLD AUTO: 8.83 K/UL — HIGH (ref 1.8–7.4)
NEUTROPHILS NFR BLD AUTO: 68.3 % — SIGNIFICANT CHANGE UP (ref 43–77)
NRBC # BLD: 0 /100 WBCS — SIGNIFICANT CHANGE UP (ref 0–0)
PLATELET # BLD AUTO: 234 K/UL — SIGNIFICANT CHANGE UP (ref 150–400)
POTASSIUM SERPL-MCNC: 4.2 MMOL/L — SIGNIFICANT CHANGE UP (ref 3.5–5.3)
POTASSIUM SERPL-SCNC: 4.2 MMOL/L — SIGNIFICANT CHANGE UP (ref 3.5–5.3)
PROT SERPL-MCNC: 6.6 G/DL — SIGNIFICANT CHANGE UP (ref 6–8.3)
RBC # BLD: 5.03 M/UL — SIGNIFICANT CHANGE UP (ref 4.2–5.8)
RBC # FLD: 13.6 % — SIGNIFICANT CHANGE UP (ref 10.3–14.5)
SODIUM SERPL-SCNC: 141 MMOL/L — SIGNIFICANT CHANGE UP (ref 135–145)
WBC # BLD: 12.95 K/UL — HIGH (ref 3.8–10.5)
WBC # FLD AUTO: 12.95 K/UL — HIGH (ref 3.8–10.5)

## 2019-11-21 PROCEDURE — 72148 MRI LUMBAR SPINE W/O DYE: CPT | Mod: 26

## 2019-11-21 PROCEDURE — 72131 CT LUMBAR SPINE W/O DYE: CPT | Mod: 26,59

## 2019-11-21 PROCEDURE — 73130 X-RAY EXAM OF HAND: CPT | Mod: 26,RT

## 2019-11-21 PROCEDURE — 99285 EMERGENCY DEPT VISIT HI MDM: CPT

## 2019-11-21 RX ORDER — OXYCODONE HYDROCHLORIDE 5 MG/1
10 TABLET ORAL EVERY 4 HOURS
Refills: 0 | Status: DISCONTINUED | OUTPATIENT
Start: 2019-11-21 | End: 2019-11-26

## 2019-11-21 RX ORDER — MAGNESIUM HYDROXIDE 400 MG/1
30 TABLET, CHEWABLE ORAL EVERY 12 HOURS
Refills: 0 | Status: DISCONTINUED | OUTPATIENT
Start: 2019-11-21 | End: 2019-11-26

## 2019-11-21 RX ORDER — GABAPENTIN 400 MG/1
300 CAPSULE ORAL ONCE
Refills: 0 | Status: COMPLETED | OUTPATIENT
Start: 2019-11-21 | End: 2019-11-21

## 2019-11-21 RX ORDER — GABAPENTIN 400 MG/1
300 CAPSULE ORAL THREE TIMES A DAY
Refills: 0 | Status: DISCONTINUED | OUTPATIENT
Start: 2019-11-21 | End: 2019-11-26

## 2019-11-21 RX ORDER — PANTOPRAZOLE SODIUM 20 MG/1
40 TABLET, DELAYED RELEASE ORAL
Refills: 0 | Status: DISCONTINUED | OUTPATIENT
Start: 2019-11-21 | End: 2019-11-26

## 2019-11-21 RX ORDER — ACETAMINOPHEN 500 MG
975 TABLET ORAL EVERY 8 HOURS
Refills: 0 | Status: COMPLETED | OUTPATIENT
Start: 2019-11-21 | End: 2019-11-22

## 2019-11-21 RX ORDER — SENNA PLUS 8.6 MG/1
2 TABLET ORAL AT BEDTIME
Refills: 0 | Status: DISCONTINUED | OUTPATIENT
Start: 2019-11-21 | End: 2019-11-26

## 2019-11-21 RX ORDER — HYDROMORPHONE HYDROCHLORIDE 2 MG/ML
1 INJECTION INTRAMUSCULAR; INTRAVENOUS; SUBCUTANEOUS EVERY 4 HOURS
Refills: 0 | Status: DISCONTINUED | OUTPATIENT
Start: 2019-11-21 | End: 2019-11-26

## 2019-11-21 RX ORDER — HYDROMORPHONE HYDROCHLORIDE 2 MG/ML
0.5 INJECTION INTRAMUSCULAR; INTRAVENOUS; SUBCUTANEOUS ONCE
Refills: 0 | Status: DISCONTINUED | OUTPATIENT
Start: 2019-11-21 | End: 2019-11-21

## 2019-11-21 RX ORDER — CYCLOBENZAPRINE HYDROCHLORIDE 10 MG/1
5 TABLET, FILM COATED ORAL THREE TIMES A DAY
Refills: 0 | Status: DISCONTINUED | OUTPATIENT
Start: 2019-11-21 | End: 2019-11-26

## 2019-11-21 RX ORDER — OXYCODONE HYDROCHLORIDE 5 MG/1
5 TABLET ORAL EVERY 4 HOURS
Refills: 0 | Status: DISCONTINUED | OUTPATIENT
Start: 2019-11-21 | End: 2019-11-26

## 2019-11-21 RX ORDER — CYCLOBENZAPRINE HYDROCHLORIDE 10 MG/1
10 TABLET, FILM COATED ORAL ONCE
Refills: 0 | Status: COMPLETED | OUTPATIENT
Start: 2019-11-21 | End: 2019-11-21

## 2019-11-21 RX ORDER — SODIUM CHLORIDE 9 MG/ML
1000 INJECTION, SOLUTION INTRAVENOUS
Refills: 0 | Status: DISCONTINUED | OUTPATIENT
Start: 2019-11-21 | End: 2019-11-22

## 2019-11-21 RX ORDER — OXYCODONE AND ACETAMINOPHEN 5; 325 MG/1; MG/1
2 TABLET ORAL ONCE
Refills: 0 | Status: DISCONTINUED | OUTPATIENT
Start: 2019-11-21 | End: 2019-11-21

## 2019-11-21 RX ADMIN — OXYCODONE AND ACETAMINOPHEN 2 TABLET(S): 5; 325 TABLET ORAL at 22:30

## 2019-11-21 RX ADMIN — GABAPENTIN 300 MILLIGRAM(S): 400 CAPSULE ORAL at 21:55

## 2019-11-21 RX ADMIN — CYCLOBENZAPRINE HYDROCHLORIDE 10 MILLIGRAM(S): 10 TABLET, FILM COATED ORAL at 21:55

## 2019-11-21 RX ADMIN — OXYCODONE AND ACETAMINOPHEN 2 TABLET(S): 5; 325 TABLET ORAL at 21:55

## 2019-11-21 NOTE — H&P ADULT - HISTORY OF PRESENT ILLNESS
Pt Name: ITZEL DAVID  MRN: [id number]    The patient was seen and examined. 54y Male s/p L4-S1 ALIF/PSF on 11/13 and discharged on 11/19 presents to  ED with worsening of lower back pain and increased numbness in his RLE. Patient states that pain has been unrelieved since his surgery with medications. Denies any new trauma.     ROS is otherwise negative.  PAST MEDICAL & SURGICAL HISTORY:  Reflux esophagitis  Lumbar radiculopathy  Diverticulitis  H/O umbilical hernia repair  History of colon resection: 6/2016      Allergies: NKDA    Medications:  HYDROmorphone  Injectable 0.5 milliGRAM(s) IV Push Once      Social History:  Ambulation: Walking independently    PHYSICAL EXAM:    T(C): 36.9 (11-21-19 @ 20:34), Max: 36.9 (11-21-19 @ 20:34)  HR: 106 (11-21-19 @ 20:34) (106 - 106)  BP: 120/79 (11-21-19 @ 20:34) (120/79 - 120/79)  RR: 19 (11-21-19 @ 20:34) (19 - 19)  SpO2: 94% (11-21-19 @ 20:34) (94% - 94%)  Wt(kg): --    General: Well-appearing adult Male lying supine; NAD; A&Ox3  Abdomen: Prineo Dressing C/D/I; Abd NT/ND  Back: DSG CDI  Motor:  LLE- Hip Flex/Knee Ext 5/5; TA/EHL/GS 5/5 strength  RLE- Hip Flex/Knee Ext 5/5; TA/EHL/GS 4/5 strength  Sensory:  LLE- Diminished sensation corresponding to L4-S1 dermatomes better than RLE that is stable; Otherwise SILT L2-3 dermatomes   RLE- Diminished sensation corresponding to L4-S1 dermatomes worse than LLE that is stable; Otherwise SILT L2-3 dermatomes  Vascular:  B/l lower extremities without significant swelling/warmth/erythema; Not concerning for presence of DVT at this time  Feet WWP; DP 2+ bilaterally; Cap refill < 2 sec     Labs:                        14.3   12.95 )-----------( 234      ( 21 Nov 2019 21:12 )             43.7     11-21    141  |  104  |  14  ----------------------------<  135<H>  4.2   |  25  |  1.01    Ca    9.4      21 Nov 2019 21:13    TPro  6.6  /  Alb  3.8  /  TBili  0.6  /  DBili  x   /  AST  19  /  ALT  32  /  AlkPhos  81  11-21    CT shows stable hardware placement with no new fractures    A/P  Pt is a 53yo Male s/p unrelieved post operative pain and worsening RLE numbness  NPO  IVF  Pain control  Hold anticoagulation for OR  Labs  UA  type and screen  CXR   EKG  MSRA nasal swab  Consented for Right S1 screw revision  d/w attending Dr. Montero Pt Name: ITZEL DAVID  MRN: [id number]    The patient was seen and examined. 54y Male s/p L4-S1 ALIF/PSF on 11/13 and discharged on 11/19 presents to  ED with worsening of lower back pain and increased numbness in his RLE. Patient states that pain has been unrelieved since his surgery with medications. Denies any new trauma to lower back. Of note, patient fell onto his right hand and also endorses pain in his R thumb MCP joint. XR negative for fractures, likely ligamentous sprain.    ROS is otherwise negative.  PAST MEDICAL & SURGICAL HISTORY:  Reflux esophagitis  Lumbar radiculopathy  Diverticulitis  H/O umbilical hernia repair  History of colon resection: 6/2016      Allergies: NKDA    Medications:  HYDROmorphone  Injectable 0.5 milliGRAM(s) IV Push Once      Social History:  Ambulation: Walking independently    PHYSICAL EXAM:    T(C): 36.9 (11-21-19 @ 20:34), Max: 36.9 (11-21-19 @ 20:34)  HR: 106 (11-21-19 @ 20:34) (106 - 106)  BP: 120/79 (11-21-19 @ 20:34) (120/79 - 120/79)  RR: 19 (11-21-19 @ 20:34) (19 - 19)  SpO2: 94% (11-21-19 @ 20:34) (94% - 94%)  Wt(kg): --    General: Well-appearing adult Male lying supine; NAD; A&Ox3  Abdomen: Prineo Dressing C/D/I; Abd NT/ND  Back: DSG CDI  Motor:  LLE- Hip Flex/Knee Ext 5/5; TA/EHL/GS 5/5 strength  RLE- Hip Flex/Knee Ext 5/5; TA/EHL/GS 4/5 strength  Sensory:  LLE- Diminished sensation corresponding to L4-S1 dermatomes better than RLE that is stable; Otherwise SILT L2-3 dermatomes   RLE- Diminished sensation corresponding to L4-S1 dermatomes worse than LLE that is stable; Otherwise SILT L2-3 dermatomes  Vascular:  B/l lower extremities without significant swelling/warmth/erythema; Not concerning for presence of DVT at this time  Feet WWP; DP 2+ bilaterally; Cap refill < 2 sec     Labs:                        14.3   12.95 )-----------( 234      ( 21 Nov 2019 21:12 )             43.7     11-21    141  |  104  |  14  ----------------------------<  135<H>  4.2   |  25  |  1.01    Ca    9.4      21 Nov 2019 21:13    TPro  6.6  /  Alb  3.8  /  TBili  0.6  /  DBili  x   /  AST  19  /  ALT  32  /  AlkPhos  81  11-21    CT shows stable hardware placement with no new fractures    A/P  Pt is a 53yo Male s/p unrelieved post operative pain and worsening RLE numbness  NPO  IVF  Pain control  Hold anticoagulation for OR  Labs  UA  type and screen  CXR   EKG  MSRA nasal swab  Consented for Right S1 screw revision  d/w attending Dr. Montero

## 2019-11-21 NOTE — ED PROVIDER NOTE - OBJECTIVE STATEMENT
53 y/o M, recently dc'd 11/19 s/p spinal fusion surgery 11/13, now with lower back pain and R leg decreased sensations and "pulsating pain" on heel which has been present since the surgery but worsened today. Today had trouble walking and fell when getting out of bed secondary to pain, no LOC, no head injury. Denies chest pain, SOB, urinary incontinence. Patient took gabapentin, flexeril, oxycodone last at 1:30PM as per instructions after procedure. 53 y/o M, recently dc'd 11/19 s/p spinal fusion surgery 11/13, now with lower back pain and R leg  "pulsating pain" in heel which has been present since the surgery but worsened today. Today had trouble walking and fell when getting out of bed secondary to pain, no LOC, no head injury, but says he thinks he sprained his right thumb when he hyperextended it as he fell. Denies chest pain, SOB, fecal or urinary incontinence.  No fever or chills. Patient took gabapentin, flexeril, oxycodone last at 1:30PM as per instructions after procedure.

## 2019-11-21 NOTE — ED PROVIDER NOTE - PHYSICAL EXAMINATION
CONSTITUTIONAL: WD,WN. NAD.    SKIN: Normal color and turgor. No rash.    HEAD: NC/AT.  EYES: Conjunctiva clear. EOMI. PERRL.    ENT: Airway patent, OP without erythema, tonsillar swelling or exudate; uvula midline without swelling. Nasal mucosa clear, no rhinorrhea.   RESPIRATORY:  Breathing non-labored. No retractions or accessory muscle use.  Lungs CTA bilat.  CARDIOVASCULAR:  RRR, S1S2. No M/R/G.      GI:  Abdomen soft, nontender.    MSK: Neck supple with painless ROM.  No lower extremity edema or calf tenderness. No joint swelling or ROM limitation.  NEURO: Alert and oriented; CN II-XII grossly intact. Speech clear. 5/5 strength in all extremities.  Normal balance and gait. CONSTITUTIONAL: WD,WN. NAD.    SKIN: Normal color and turgor. No rash.    HEAD: NC/AT.  EYES: Conjunctiva clear. EOMI. PERRL.    ENT: Airway patent, OP without erythema, tonsillar swelling or exudate; uvula midline without swelling. Nasal mucosa clear, no rhinorrhea.   RESPIRATORY:  Breathing non-labored. No retractions or accessory muscle use.  Lungs CTA bilat.  CARDIOVASCULAR:  RRR, S1S2. No M/R/G.      GI:  Abdomen soft, nontender.    MSK: Neck supple with painless ROM.  No lower extremity edema or calf tenderness. No joint swelling or ROM limitation. Mild soft tissue tenderness at volar right thumb MCP, no swelling or ROM limitation. No wrist tenderness/snuffbox tenderness, or swelling.   NEURO: Alert and oriented; CN II-XII grossly intact. Speech clear. 5/5 strength in all extremities. Rectal tone normal.  +2 DTR bilat patellar, achilles.  No clonus.

## 2019-11-21 NOTE — ED PROVIDER NOTE - ATTENDING CONTRIBUTION TO CARE
worsening back and leg pain post recent spinal surgery.  reports symptoms present in hospital but worsened now.  tried oral meds without relief.  no trauma, no fever.  sensation intact.  pain aggravated by movement.  requested multiple doses of dilaudid.  ortho consulted, requested mri, admit for further management

## 2019-11-21 NOTE — ED PROVIDER NOTE - NS ED ROS FT
CONSTITUTIONAL: No fever, chills, or weakness  NEURO: No headache, no dizziness, no syncope; No focal weakness/tingling/numbness  EYES: No visual changes  ENT: No rhinorrhea or sore throat  PULM: No cough or dyspnea  CV: No chest pain or palpitations  GI: No abdominal pain, vomiting, or diarrhea  : No dysuria, hematuria, frequency  MSK: Lower back pain, R leg heel pain and numbness  SKIN: no rash or unusual bruising

## 2019-11-21 NOTE — ED ADULT NURSE NOTE - OBJECTIVE STATEMENT
55 y/o male received into the ED, complaining of lower back pain and pain to both lower legs.  Pt. is s/p lumbar surgery 2 days ago with surgical wound to the LLQ of the abdomen.  Pt. states that since the surgery he has had "stabbing, searing pain" to both lower legs.  Pt. is ambulating with difficulty and dragging motion of the right leg with the help of a cane.  Pt. states that after the surgery he has developed numbness to both lower legs.  Surgical wound to abdomen is clean and dry, no warmth to touch, no s/s of infection.  Pt. is afebrile in the ED.

## 2019-11-21 NOTE — ED ADULT NURSE REASSESSMENT NOTE - NS ED NURSE REASSESS COMMENT FT1
Pt. was sent to MRI in no acute distress.  Forty minutes after transfer to department, MRI called stating that pt. wanted more pain medicine before the test.  PA was notified immediately.  More pain medication was ordered, RN took medication to MRI but pt. was already on MRI table.  medication return to the ER for administration once the pt. returns, DEMETRIA Escoto notified.

## 2019-11-21 NOTE — ED PROVIDER NOTE - CLINICAL SUMMARY MEDICAL DECISION MAKING FREE TEXT BOX
Worsening right low back pain with radiation to right leg s/p lumbar spine fusion.  No signs of infection.  Wounds look good.  AVSS.  Neuro exam intact.  Ortho consulted, will admit for OR and possible screw revision.

## 2019-11-22 PROBLEM — M54.16 RADICULOPATHY, LUMBAR REGION: Chronic | Status: ACTIVE | Noted: 2019-11-12

## 2019-11-22 PROBLEM — K21.0 GASTRO-ESOPHAGEAL REFLUX DISEASE WITH ESOPHAGITIS: Chronic | Status: ACTIVE | Noted: 2019-11-12

## 2019-11-22 PROBLEM — K57.92 DIVERTICULITIS OF INTESTINE, PART UNSPECIFIED, WITHOUT PERFORATION OR ABSCESS WITHOUT BLEEDING: Chronic | Status: ACTIVE | Noted: 2019-11-12

## 2019-11-22 LAB
ANION GAP SERPL CALC-SCNC: 10 MMOL/L — SIGNIFICANT CHANGE UP (ref 5–17)
APPEARANCE UR: CLEAR — SIGNIFICANT CHANGE UP
BASOPHILS # BLD AUTO: 0.04 K/UL — SIGNIFICANT CHANGE UP (ref 0–0.2)
BASOPHILS NFR BLD AUTO: 0.3 % — SIGNIFICANT CHANGE UP (ref 0–2)
BILIRUB UR-MCNC: NEGATIVE — SIGNIFICANT CHANGE UP
BUN SERPL-MCNC: 13 MG/DL — SIGNIFICANT CHANGE UP (ref 7–23)
CALCIUM SERPL-MCNC: 8.7 MG/DL — SIGNIFICANT CHANGE UP (ref 8.4–10.5)
CHLORIDE SERPL-SCNC: 105 MMOL/L — SIGNIFICANT CHANGE UP (ref 96–108)
CO2 SERPL-SCNC: 24 MMOL/L — SIGNIFICANT CHANGE UP (ref 22–31)
COLOR SPEC: YELLOW — SIGNIFICANT CHANGE UP
CREAT SERPL-MCNC: 0.89 MG/DL — SIGNIFICANT CHANGE UP (ref 0.5–1.3)
DIFF PNL FLD: NEGATIVE — SIGNIFICANT CHANGE UP
EOSINOPHIL # BLD AUTO: 0.28 K/UL — SIGNIFICANT CHANGE UP (ref 0–0.5)
EOSINOPHIL NFR BLD AUTO: 2.4 % — SIGNIFICANT CHANGE UP (ref 0–6)
GLUCOSE SERPL-MCNC: 122 MG/DL — HIGH (ref 70–99)
GLUCOSE UR QL: NEGATIVE — SIGNIFICANT CHANGE UP
HCT VFR BLD CALC: 37.7 % — LOW (ref 39–50)
HGB BLD-MCNC: 12.5 G/DL — LOW (ref 13–17)
IMM GRANULOCYTES NFR BLD AUTO: 1.4 % — SIGNIFICANT CHANGE UP (ref 0–1.5)
KETONES UR-MCNC: NEGATIVE — SIGNIFICANT CHANGE UP
LEUKOCYTE ESTERASE UR-ACNC: NEGATIVE — SIGNIFICANT CHANGE UP
LYMPHOCYTES # BLD AUTO: 27.5 % — SIGNIFICANT CHANGE UP (ref 13–44)
LYMPHOCYTES # BLD AUTO: 3.27 K/UL — SIGNIFICANT CHANGE UP (ref 1–3.3)
MCHC RBC-ENTMCNC: 28.7 PG — SIGNIFICANT CHANGE UP (ref 27–34)
MCHC RBC-ENTMCNC: 33.2 GM/DL — SIGNIFICANT CHANGE UP (ref 32–36)
MCV RBC AUTO: 86.5 FL — SIGNIFICANT CHANGE UP (ref 80–100)
MONOCYTES # BLD AUTO: 1.2 K/UL — HIGH (ref 0–0.9)
MONOCYTES NFR BLD AUTO: 10.1 % — SIGNIFICANT CHANGE UP (ref 2–14)
NEUTROPHILS # BLD AUTO: 6.93 K/UL — SIGNIFICANT CHANGE UP (ref 1.8–7.4)
NEUTROPHILS NFR BLD AUTO: 58.3 % — SIGNIFICANT CHANGE UP (ref 43–77)
NITRITE UR-MCNC: NEGATIVE — SIGNIFICANT CHANGE UP
NRBC # BLD: 0 /100 WBCS — SIGNIFICANT CHANGE UP (ref 0–0)
PH UR: 6.5 — SIGNIFICANT CHANGE UP (ref 5–8)
PLATELET # BLD AUTO: 215 K/UL — SIGNIFICANT CHANGE UP (ref 150–400)
POTASSIUM SERPL-MCNC: 3.8 MMOL/L — SIGNIFICANT CHANGE UP (ref 3.5–5.3)
POTASSIUM SERPL-SCNC: 3.8 MMOL/L — SIGNIFICANT CHANGE UP (ref 3.5–5.3)
PROT UR-MCNC: NEGATIVE MG/DL — SIGNIFICANT CHANGE UP
RBC # BLD: 4.36 M/UL — SIGNIFICANT CHANGE UP (ref 4.2–5.8)
RBC # FLD: 13.9 % — SIGNIFICANT CHANGE UP (ref 10.3–14.5)
SODIUM SERPL-SCNC: 139 MMOL/L — SIGNIFICANT CHANGE UP (ref 135–145)
SP GR SPEC: 1.02 — SIGNIFICANT CHANGE UP (ref 1–1.03)
UROBILINOGEN FLD QL: 0.2 E.U./DL — SIGNIFICANT CHANGE UP
WBC # BLD: 11.89 K/UL — HIGH (ref 3.8–10.5)
WBC # FLD AUTO: 11.89 K/UL — HIGH (ref 3.8–10.5)

## 2019-11-22 RX ORDER — INFLUENZA VIRUS VACCINE 15; 15; 15; 15 UG/.5ML; UG/.5ML; UG/.5ML; UG/.5ML
0.5 SUSPENSION INTRAMUSCULAR ONCE
Refills: 0 | Status: DISCONTINUED | OUTPATIENT
Start: 2019-11-22 | End: 2019-11-26

## 2019-11-22 RX ORDER — SODIUM CHLORIDE 9 MG/ML
1000 INJECTION, SOLUTION INTRAVENOUS
Refills: 0 | Status: DISCONTINUED | OUTPATIENT
Start: 2019-11-22 | End: 2019-11-25

## 2019-11-22 RX ORDER — POVIDONE-IODINE 5 %
1 AEROSOL (ML) TOPICAL ONCE
Refills: 0 | Status: COMPLETED | OUTPATIENT
Start: 2019-11-22 | End: 2019-11-22

## 2019-11-22 RX ADMIN — GABAPENTIN 300 MILLIGRAM(S): 400 CAPSULE ORAL at 14:14

## 2019-11-22 RX ADMIN — OXYCODONE HYDROCHLORIDE 10 MILLIGRAM(S): 5 TABLET ORAL at 20:28

## 2019-11-22 RX ADMIN — HYDROMORPHONE HYDROCHLORIDE 1 MILLIGRAM(S): 2 INJECTION INTRAMUSCULAR; INTRAVENOUS; SUBCUTANEOUS at 12:40

## 2019-11-22 RX ADMIN — Medication 975 MILLIGRAM(S): at 21:43

## 2019-11-22 RX ADMIN — SODIUM CHLORIDE 80 MILLILITER(S): 9 INJECTION, SOLUTION INTRAVENOUS at 00:55

## 2019-11-22 RX ADMIN — OXYCODONE HYDROCHLORIDE 10 MILLIGRAM(S): 5 TABLET ORAL at 23:18

## 2019-11-22 RX ADMIN — Medication 975 MILLIGRAM(S): at 15:14

## 2019-11-22 RX ADMIN — HYDROMORPHONE HYDROCHLORIDE 0.5 MILLIGRAM(S): 2 INJECTION INTRAMUSCULAR; INTRAVENOUS; SUBCUTANEOUS at 00:13

## 2019-11-22 RX ADMIN — OXYCODONE HYDROCHLORIDE 10 MILLIGRAM(S): 5 TABLET ORAL at 01:17

## 2019-11-22 RX ADMIN — GABAPENTIN 300 MILLIGRAM(S): 400 CAPSULE ORAL at 21:43

## 2019-11-22 RX ADMIN — PANTOPRAZOLE SODIUM 40 MILLIGRAM(S): 20 TABLET, DELAYED RELEASE ORAL at 06:45

## 2019-11-22 RX ADMIN — HYDROMORPHONE HYDROCHLORIDE 0.5 MILLIGRAM(S): 2 INJECTION INTRAMUSCULAR; INTRAVENOUS; SUBCUTANEOUS at 00:35

## 2019-11-22 RX ADMIN — Medication 1 APPLICATION(S): at 10:26

## 2019-11-22 RX ADMIN — OXYCODONE HYDROCHLORIDE 10 MILLIGRAM(S): 5 TABLET ORAL at 23:55

## 2019-11-22 RX ADMIN — Medication 975 MILLIGRAM(S): at 06:45

## 2019-11-22 RX ADMIN — Medication 975 MILLIGRAM(S): at 22:00

## 2019-11-22 RX ADMIN — Medication 975 MILLIGRAM(S): at 14:14

## 2019-11-22 RX ADMIN — OXYCODONE HYDROCHLORIDE 10 MILLIGRAM(S): 5 TABLET ORAL at 08:40

## 2019-11-22 RX ADMIN — OXYCODONE HYDROCHLORIDE 10 MILLIGRAM(S): 5 TABLET ORAL at 09:10

## 2019-11-22 RX ADMIN — HYDROMORPHONE HYDROCHLORIDE 1 MILLIGRAM(S): 2 INJECTION INTRAMUSCULAR; INTRAVENOUS; SUBCUTANEOUS at 18:05

## 2019-11-22 RX ADMIN — OXYCODONE HYDROCHLORIDE 10 MILLIGRAM(S): 5 TABLET ORAL at 19:48

## 2019-11-22 RX ADMIN — HYDROMORPHONE HYDROCHLORIDE 1 MILLIGRAM(S): 2 INJECTION INTRAMUSCULAR; INTRAVENOUS; SUBCUTANEOUS at 12:13

## 2019-11-22 RX ADMIN — SENNA PLUS 2 TABLET(S): 8.6 TABLET ORAL at 21:43

## 2019-11-22 RX ADMIN — GABAPENTIN 300 MILLIGRAM(S): 400 CAPSULE ORAL at 06:45

## 2019-11-22 RX ADMIN — HYDROMORPHONE HYDROCHLORIDE 1 MILLIGRAM(S): 2 INJECTION INTRAMUSCULAR; INTRAVENOUS; SUBCUTANEOUS at 17:54

## 2019-11-22 RX ADMIN — OXYCODONE HYDROCHLORIDE 10 MILLIGRAM(S): 5 TABLET ORAL at 02:03

## 2019-11-22 NOTE — PROGRESS NOTE ADULT - SUBJECTIVE AND OBJECTIVE BOX
Orthopaedic Spine Resident Note    **Patient seen and examined earlier this AM**    S:  Patient admitted overnight for worsening neurologic symptoms including numbness/weakness of RLE    No fevers/chills/shortness of breath/chest pain.    O:  Vital Signs Last 24 Hrs  T(C): 36.7 (2019 16:38), Max: 37.1 (2019 14:34)  T(F): 98.1 (2019 16:38), Max: 98.8 (2019 14:34)  HR: 74 (2019 16:38) (68 - 106)  BP: 114/74 (2019 16:38) (98/60 - 122/83)  BP(mean): --  RR: 17 (2019 16:38) (16 - 19)  SpO2: 92% (2019 16:38) (92% - 98%)  VSS  General: Well-appearing adult Male lying supine; NAD; A&Ox3  Abdomen: Prineo Dressing C/D/I; Abd NT/ND  Back: Prineo Dressing CDI x2  Motor:  LLE- Hip Flex/Knee Ext 5/5; TA/EHL/GS 4+/5 strength  RLE- Hip Flex/Knee Ext 5/5; TA/EHL/GS 4/5 strength  Sensory:  LLE- Diminished sensation corresponding to L4-S1 dermatomes better than RLE that is stable; Otherwise SILT L2-3 dermatomes   RLE- Diminished sensation corresponding to L4-S1 dermatomes worse than LLE that is worsening compared to prior admission; Otherwise SILT L2-3 dermatomes  Vascular:  B/l lower extremities without significant swelling/warmth/erythema; Not concerning for presence of DVT at this time  Feet WWP; DP 2+ bilaterally; Cap refill < 2 sec    Labs:                        12.5   11.89 )-----------( 215      ( 2019 05:34 )             37.7       11-    139  |  105  |  13  ----------------------------<  122<H>  3.8   |  24  |  0.89    Ca    8.7      2019 05:33    TPro  6.6  /  Alb  3.8  /  TBili  0.6  /  DBili  x   /  AST  19  /  ALT  32  /  AlkPhos  81  11-21      Urinalysis Basic - ( 2019 12:34 )    Color: Yellow / Appearance: Clear / S.025 / pH: x  Gluc: x / Ketone: NEGATIVE  / Bili: Negative / Urobili: 0.2 E.U./dL   Blood: x / Protein: NEGATIVE mg/dL / Nitrite: NEGATIVE   Leuk Esterase: NEGATIVE / RBC: x / WBC x   Sq Epi: x / Non Sq Epi: x / Bacteria: x Orthopaedic Spine Resident Note    **Patient seen and examined earlier this AM**    S:  Patient admitted overnight for worsening neurologic symptoms including numbness/weakness of RLE    No fevers/chills/shortness of breath/chest pain.  Pt with mild injury to R thumb due to fall at home    O:  Vital Signs Last 24 Hrs  T(C): 36.7 (2019 16:38), Max: 37.1 (2019 14:34)  T(F): 98.1 (2019 16:38), Max: 98.8 (2019 14:34)  HR: 74 (2019 16:38) (68 - 106)  BP: 114/74 (2019 16:38) (98/60 - 122/83)  BP(mean): --  RR: 17 (2019 16:38) (16 - 19)  SpO2: 92% (2019 16:38) (92% - 98%)  VSS  General: Well-appearing adult Male lying supine; NAD; A&Ox3; Thumb spica brace on RUE  Abdomen: Prineo Dressing C/D/I; Abd NT/ND  Back: Prineo Dressing CDI x2  Motor:  LLE- Hip Flex/Knee Ext 5/5; TA/EHL/GS 4+/5 strength  RLE- Hip Flex/Knee Ext 5/5; TA/EHL/GS 4/5 strength  Sensory:  LLE- Diminished sensation corresponding to L4-S1 dermatomes better than RLE that is stable; Otherwise SILT L2-3 dermatomes   RLE- Diminished sensation corresponding to L4-S1 dermatomes worse than LLE that is worsening compared to prior admission; Otherwise SILT L2-3 dermatomes  Vascular:  B/l lower extremities without significant swelling/warmth/erythema; Not concerning for presence of DVT at this time  Feet WWP; DP 2+ bilaterally; Cap refill < 2 sec    Labs:                        12.5   11.89 )-----------( 215      ( 2019 05:34 )             37.7       -    139  |  105  |  13  ----------------------------<  122<H>  3.8   |  24  |  0.89    Ca    8.7      2019 05:33    TPro  6.6  /  Alb  3.8  /  TBili  0.6  /  DBili  x   /  AST  19  /  ALT  32  /  AlkPhos  81  11-21      Urinalysis Basic - ( 2019 12:34 )    Color: Yellow / Appearance: Clear / S.025 / pH: x  Gluc: x / Ketone: NEGATIVE  / Bili: Negative / Urobili: 0.2 E.U./dL   Blood: x / Protein: NEGATIVE mg/dL / Nitrite: NEGATIVE   Leuk Esterase: NEGATIVE / RBC: x / WBC x   Sq Epi: x / Non Sq Epi: x / Bacteria: x

## 2019-11-22 NOTE — PROGRESS NOTE ADULT - SUBJECTIVE AND OBJECTIVE BOX
Call from Radiology regarding CT findings and hardware at L4-L5 level  Question of concern for anterior migration of hardware     Reviewed images/findings with Dr. Montero   Fusion at L4-L5 in place per Dr Montero without migration.

## 2019-11-22 NOTE — PROGRESS NOTE ADULT - ASSESSMENT
A/P: 54y Male s/p L4-S1 ALIF/PSF on 11/13 now presenting with exacerbated RLE symptoms    - Plan for OR 11/23 for revision PSF  - Pain control  - Nausea control/Bowel regiment  - NPO/IVF at midnight  - Home meds  - WBAT  - DVT ppx: Ana    Ortho Pager [phone]

## 2019-11-23 RX ORDER — CEFAZOLIN SODIUM 1 G
2000 VIAL (EA) INJECTION EVERY 8 HOURS
Refills: 0 | Status: COMPLETED | OUTPATIENT
Start: 2019-11-23 | End: 2019-11-23

## 2019-11-23 RX ORDER — ACETAMINOPHEN 500 MG
650 TABLET ORAL EVERY 6 HOURS
Refills: 0 | Status: DISCONTINUED | OUTPATIENT
Start: 2019-11-23 | End: 2019-11-26

## 2019-11-23 RX ORDER — MORPHINE SULFATE 50 MG/1
4 CAPSULE, EXTENDED RELEASE ORAL
Refills: 0 | Status: DISCONTINUED | OUTPATIENT
Start: 2019-11-23 | End: 2019-11-25

## 2019-11-23 RX ORDER — CEFAZOLIN SODIUM 1 G
2000 VIAL (EA) INJECTION EVERY 8 HOURS
Refills: 0 | Status: DISCONTINUED | OUTPATIENT
Start: 2019-11-23 | End: 2019-11-23

## 2019-11-23 RX ADMIN — HYDROMORPHONE HYDROCHLORIDE 1 MILLIGRAM(S): 2 INJECTION INTRAMUSCULAR; INTRAVENOUS; SUBCUTANEOUS at 15:46

## 2019-11-23 RX ADMIN — SODIUM CHLORIDE 80 MILLILITER(S): 9 INJECTION, SOLUTION INTRAVENOUS at 00:00

## 2019-11-23 RX ADMIN — MORPHINE SULFATE 4 MILLIGRAM(S): 50 CAPSULE, EXTENDED RELEASE ORAL at 10:46

## 2019-11-23 RX ADMIN — OXYCODONE HYDROCHLORIDE 10 MILLIGRAM(S): 5 TABLET ORAL at 03:54

## 2019-11-23 RX ADMIN — HYDROMORPHONE HYDROCHLORIDE 1 MILLIGRAM(S): 2 INJECTION INTRAMUSCULAR; INTRAVENOUS; SUBCUTANEOUS at 01:00

## 2019-11-23 RX ADMIN — OXYCODONE HYDROCHLORIDE 10 MILLIGRAM(S): 5 TABLET ORAL at 04:30

## 2019-11-23 RX ADMIN — CYCLOBENZAPRINE HYDROCHLORIDE 5 MILLIGRAM(S): 10 TABLET, FILM COATED ORAL at 19:34

## 2019-11-23 RX ADMIN — HYDROMORPHONE HYDROCHLORIDE 1 MILLIGRAM(S): 2 INJECTION INTRAMUSCULAR; INTRAVENOUS; SUBCUTANEOUS at 00:21

## 2019-11-23 RX ADMIN — Medication 1000 MILLIGRAM(S): at 23:37

## 2019-11-23 RX ADMIN — GABAPENTIN 300 MILLIGRAM(S): 400 CAPSULE ORAL at 22:31

## 2019-11-23 RX ADMIN — OXYCODONE HYDROCHLORIDE 10 MILLIGRAM(S): 5 TABLET ORAL at 19:33

## 2019-11-23 RX ADMIN — SENNA PLUS 2 TABLET(S): 8.6 TABLET ORAL at 22:31

## 2019-11-23 RX ADMIN — HYDROMORPHONE HYDROCHLORIDE 1 MILLIGRAM(S): 2 INJECTION INTRAMUSCULAR; INTRAVENOUS; SUBCUTANEOUS at 04:37

## 2019-11-23 RX ADMIN — PANTOPRAZOLE SODIUM 40 MILLIGRAM(S): 20 TABLET, DELAYED RELEASE ORAL at 06:35

## 2019-11-23 RX ADMIN — CYCLOBENZAPRINE HYDROCHLORIDE 5 MILLIGRAM(S): 10 TABLET, FILM COATED ORAL at 00:49

## 2019-11-23 RX ADMIN — HYDROMORPHONE HYDROCHLORIDE 1 MILLIGRAM(S): 2 INJECTION INTRAMUSCULAR; INTRAVENOUS; SUBCUTANEOUS at 05:30

## 2019-11-23 RX ADMIN — HYDROMORPHONE HYDROCHLORIDE 1 MILLIGRAM(S): 2 INJECTION INTRAMUSCULAR; INTRAVENOUS; SUBCUTANEOUS at 20:56

## 2019-11-23 RX ADMIN — HYDROMORPHONE HYDROCHLORIDE 1 MILLIGRAM(S): 2 INJECTION INTRAMUSCULAR; INTRAVENOUS; SUBCUTANEOUS at 21:30

## 2019-11-23 RX ADMIN — Medication 650 MILLIGRAM(S): at 22:49

## 2019-11-23 RX ADMIN — Medication 2000 MILLIGRAM(S): at 15:46

## 2019-11-23 RX ADMIN — GABAPENTIN 300 MILLIGRAM(S): 400 CAPSULE ORAL at 15:46

## 2019-11-23 RX ADMIN — OXYCODONE HYDROCHLORIDE 10 MILLIGRAM(S): 5 TABLET ORAL at 20:30

## 2019-11-23 RX ADMIN — GABAPENTIN 300 MILLIGRAM(S): 400 CAPSULE ORAL at 06:36

## 2019-11-23 RX ADMIN — HYDROMORPHONE HYDROCHLORIDE 1 MILLIGRAM(S): 2 INJECTION INTRAMUSCULAR; INTRAVENOUS; SUBCUTANEOUS at 16:30

## 2019-11-23 RX ADMIN — OXYCODONE HYDROCHLORIDE 10 MILLIGRAM(S): 5 TABLET ORAL at 23:39

## 2019-11-23 NOTE — BRIEF OPERATIVE NOTE - NSICDXBRIEFPROCEDURE_GEN_ALL_CORE_FT
PROCEDURES:  Revision, procedure involving hardware, spine, lumbar 23-Nov-2019 09:51:17  Ramana Flores

## 2019-11-23 NOTE — PROGRESS NOTE ADULT - SUBJECTIVE AND OBJECTIVE BOX
Ortho Note    denies pain this AM secondary to pain meds, however continues to endorse numbness and tingling over R foot  Denies CP, SOB, N/V.     Vital Signs Last 24 Hrs  T(C): 36.8 (11-23-19 @ 06:40), Max: 37.1 (11-23-19 @ 05:49)  T(F): 98.2 (11-23-19 @ 06:40), Max: 98.7 (11-23-19 @ 05:49)  HR: 60 (11-23-19 @ 06:40) (60 - 60)  BP: 97/62 (11-23-19 @ 06:40) (97/62 - 97/62)  BP(mean): --  RR: 14 (11-23-19 @ 06:40) (14 - 16)  SpO2: 94% (11-23-19 @ 06:40) (93% - 94%)      General: In NAD   Prineo Dressing C/D/I; Abd NT/ND  Prineo Dressing CDI  Motor:  LLE- Hip Flex/Knee Ext 5/5; TA/EHL/GS 4+/5 strength  RLE- Hip Flex/Knee Ext 5/5; TA/EHL/GS 4/5 strength  Sensory:  LLE- Diminished sensation corresponding to L4-S1 dermatomes better than RLE that is stable; Otherwise SILT L2-3 dermatomes   RLE- Diminished sensation corresponding to L4-S1 dermatomes worse than LLE that is worsening compared to prior admission; Otherwise SILT L2-3 dermatomes  Vascular:  B/l lower extremities without significant swelling/warmth/erythema; Not concerning for presence of DVT at this time  Feet WWP; DP 2+ bilaterally; Cap refill < 2 sec                          12.5   11.89 )-----------( 215      ( 22 Nov 2019 05:34 )             37.7   22 Nov 2019 05:33    139    |  105    |  13     ----------------------------<  122    3.8     |  24     |  0.89       TPro  6.6    /  Alb  3.8    /  TBili  0.6    /  DBili  x      /  AST  19     /  ALT  32     /  AlkPhos  81     21 Nov 2019 21:13      A/P: 54y Male s/p L4-S1 ALIF/PSF on 11/13 now presenting with exacerbated RLE symptoms    - Plan for OR today for revision PSF  - Pain control  - Nausea control/Bowel regiment  - NPO/IVF at midnight  - Home meds  - WBAT  - DVT ppx: Ana    Ortho Pager [phone]

## 2019-11-23 NOTE — PROGRESS NOTE ADULT - SUBJECTIVE AND OBJECTIVE BOX
Ortho Post Op Check    Procedure: R PSF hardware removal  Surgeon: Kylah     Endorses surgical site pain, states that radiculopathy in R foot unchanged at this point. Not worse than pre-op  Denies CP, SOB, N/V.     Vital Signs Last 24 Hrs  T(C): 36.7 (23 Nov 2019 09:49), Max: 37.1 (22 Nov 2019 14:34)  T(F): 98 (23 Nov 2019 09:49), Max: 98.8 (22 Nov 2019 14:34)  HR: 66 (23 Nov 2019 11:00) (60 - 98)  BP: 106/75 (23 Nov 2019 11:00) (97/62 - 135/86)  BP(mean): 94 (23 Nov 2019 10:45) (87 - 112)  RR: 14 (23 Nov 2019 11:00) (10 - 17)  SpO2: 93% (23 Nov 2019 11:00) (89% - 98%)      General: In NAD   Prineo Dressing C/D/I; Abd NT/ND  Prineo Dressing CDI  Motor:  LLE- Hip Flex/Knee Ext 5/5; TA/EHL/GS 5/5 strength  RLE- Hip Flex/Knee Ext 5/5; TA/EHL/GS 4+/5 strength  Sensory:  LLE- Diminished sensation corresponding to L4-S1 dermatomes better than RLE that is stable; Otherwise SILT L2-3 dermatomes   RLE- Diminished sensation corresponding to L4-S1 dermatomes worse than LLE that is worsening compared to prior admission; Otherwise SILT L2-3 dermatomes  Vascular:  B/l lower extremities without significant swelling/warmth/erythema; Not concerning for presence of DVT at this time  Feet WWP; DP 2+ bilaterally; Cap refill < 2 sec                            12.5   11.89 )-----------( 215      ( 22 Nov 2019 05:34 )             37.7     11-22    139  |  105  |  13  ----------------------------<  122<H>  3.8   |  24  |  0.89    Ca    8.7      22 Nov 2019 05:33        A/P: 54y Male s/p L4-S1 ALIF/PSF on 11/13 now s/p removal of R hardware 11/23/19    - Pain control  - Nausea control/Bowel regiment  - Regular diet  - Home meds  - WBAT  - DVT ppx: Ana    Ortho Pager [phone] Ortho Post Op Check    Procedure: R PSF hardware removal  Surgeon: Kylah     Endorses surgical site pain, states that radiculopathy in R foot unchanged at this point. Not worse than pre-op  Denies CP, SOB, N/V.     Vital Signs Last 24 Hrs  T(C): 36.7 (23 Nov 2019 09:49), Max: 37.1 (22 Nov 2019 14:34)  T(F): 98 (23 Nov 2019 09:49), Max: 98.8 (22 Nov 2019 14:34)  HR: 66 (23 Nov 2019 11:00) (60 - 98)  BP: 106/75 (23 Nov 2019 11:00) (97/62 - 135/86)  BP(mean): 94 (23 Nov 2019 10:45) (87 - 112)  RR: 14 (23 Nov 2019 11:00) (10 - 17)  SpO2: 93% (23 Nov 2019 11:00) (89% - 98%)      General: In NAD   Prineo Dressing C/D/I; Abd NT/ND  Prineo Dressing CDI  Drains: HVx1 with SS drainage  Motor:  LLE- Hip Flex/Knee Ext 5/5; TA/EHL/GS 5/5 strength  RLE- Hip Flex/Knee Ext 5/5; TA/EHL/GS 4+/5 strength  Sensory:  LLE- Diminished sensation corresponding to L4-S1 dermatomes better than RLE that is stable; Otherwise SILT L2-3 dermatomes   RLE- Diminished sensation corresponding to L4-S1 dermatomes worse than LLE that is worsening compared to prior admission; Otherwise SILT L2-3 dermatomes  Vascular:  B/l lower extremities without significant swelling/warmth/erythema; Not concerning for presence of DVT at this time  Feet WWP; DP 2+ bilaterally; Cap refill < 2 sec                            12.5   11.89 )-----------( 215      ( 22 Nov 2019 05:34 )             37.7     11-22    139  |  105  |  13  ----------------------------<  122<H>  3.8   |  24  |  0.89    Ca    8.7      22 Nov 2019 05:33        A/P: 54y Male s/p L4-S1 ALIF/PSF on 11/13 now s/p removal of R hardware 11/23/19    - Pain control  - Nausea control/Bowel regiment  - Regular diet  - Home meds  - WBAT  - DVT ppx: Ana    Ortho Pager [phone]

## 2019-11-24 LAB
ANION GAP SERPL CALC-SCNC: 10 MMOL/L — SIGNIFICANT CHANGE UP (ref 5–17)
BASOPHILS # BLD AUTO: 0.02 K/UL — SIGNIFICANT CHANGE UP (ref 0–0.2)
BASOPHILS NFR BLD AUTO: 0.1 % — SIGNIFICANT CHANGE UP (ref 0–2)
BUN SERPL-MCNC: 8 MG/DL — SIGNIFICANT CHANGE UP (ref 7–23)
CALCIUM SERPL-MCNC: 8.6 MG/DL — SIGNIFICANT CHANGE UP (ref 8.4–10.5)
CHLORIDE SERPL-SCNC: 103 MMOL/L — SIGNIFICANT CHANGE UP (ref 96–108)
CO2 SERPL-SCNC: 25 MMOL/L — SIGNIFICANT CHANGE UP (ref 22–31)
CREAT SERPL-MCNC: 0.96 MG/DL — SIGNIFICANT CHANGE UP (ref 0.5–1.3)
EOSINOPHIL # BLD AUTO: 0.33 K/UL — SIGNIFICANT CHANGE UP (ref 0–0.5)
EOSINOPHIL NFR BLD AUTO: 2.4 % — SIGNIFICANT CHANGE UP (ref 0–6)
GLUCOSE SERPL-MCNC: 112 MG/DL — HIGH (ref 70–99)
HCT VFR BLD CALC: 38.5 % — LOW (ref 39–50)
HGB BLD-MCNC: 12.8 G/DL — LOW (ref 13–17)
IMM GRANULOCYTES NFR BLD AUTO: 0.9 % — SIGNIFICANT CHANGE UP (ref 0–1.5)
LYMPHOCYTES # BLD AUTO: 1.74 K/UL — SIGNIFICANT CHANGE UP (ref 1–3.3)
LYMPHOCYTES # BLD AUTO: 12.8 % — LOW (ref 13–44)
MCHC RBC-ENTMCNC: 28.9 PG — SIGNIFICANT CHANGE UP (ref 27–34)
MCHC RBC-ENTMCNC: 33.2 GM/DL — SIGNIFICANT CHANGE UP (ref 32–36)
MCV RBC AUTO: 86.9 FL — SIGNIFICANT CHANGE UP (ref 80–100)
MONOCYTES # BLD AUTO: 1.14 K/UL — HIGH (ref 0–0.9)
MONOCYTES NFR BLD AUTO: 8.4 % — SIGNIFICANT CHANGE UP (ref 2–14)
NEUTROPHILS # BLD AUTO: 10.2 K/UL — HIGH (ref 1.8–7.4)
NEUTROPHILS NFR BLD AUTO: 75.4 % — SIGNIFICANT CHANGE UP (ref 43–77)
NRBC # BLD: 0 /100 WBCS — SIGNIFICANT CHANGE UP (ref 0–0)
PLATELET # BLD AUTO: 233 K/UL — SIGNIFICANT CHANGE UP (ref 150–400)
POTASSIUM SERPL-MCNC: 4 MMOL/L — SIGNIFICANT CHANGE UP (ref 3.5–5.3)
POTASSIUM SERPL-SCNC: 4 MMOL/L — SIGNIFICANT CHANGE UP (ref 3.5–5.3)
RBC # BLD: 4.43 M/UL — SIGNIFICANT CHANGE UP (ref 4.2–5.8)
RBC # FLD: 13.8 % — SIGNIFICANT CHANGE UP (ref 10.3–14.5)
SODIUM SERPL-SCNC: 138 MMOL/L — SIGNIFICANT CHANGE UP (ref 135–145)
WBC # BLD: 13.55 K/UL — HIGH (ref 3.8–10.5)
WBC # FLD AUTO: 13.55 K/UL — HIGH (ref 3.8–10.5)

## 2019-11-24 RX ORDER — DIPHENHYDRAMINE HCL 50 MG
25 CAPSULE ORAL EVERY 4 HOURS
Refills: 0 | Status: DISCONTINUED | OUTPATIENT
Start: 2019-11-24 | End: 2019-11-25

## 2019-11-24 RX ORDER — DEXAMETHASONE 0.5 MG/5ML
4 ELIXIR ORAL EVERY 6 HOURS
Refills: 0 | Status: COMPLETED | OUTPATIENT
Start: 2019-11-24 | End: 2019-11-25

## 2019-11-24 RX ADMIN — HYDROMORPHONE HYDROCHLORIDE 1 MILLIGRAM(S): 2 INJECTION INTRAMUSCULAR; INTRAVENOUS; SUBCUTANEOUS at 06:10

## 2019-11-24 RX ADMIN — OXYCODONE HYDROCHLORIDE 10 MILLIGRAM(S): 5 TABLET ORAL at 00:23

## 2019-11-24 RX ADMIN — OXYCODONE HYDROCHLORIDE 10 MILLIGRAM(S): 5 TABLET ORAL at 09:38

## 2019-11-24 RX ADMIN — HYDROMORPHONE HYDROCHLORIDE 1 MILLIGRAM(S): 2 INJECTION INTRAMUSCULAR; INTRAVENOUS; SUBCUTANEOUS at 01:00

## 2019-11-24 RX ADMIN — OXYCODONE HYDROCHLORIDE 5 MILLIGRAM(S): 5 TABLET ORAL at 22:53

## 2019-11-24 RX ADMIN — CYCLOBENZAPRINE HYDROCHLORIDE 5 MILLIGRAM(S): 10 TABLET, FILM COATED ORAL at 07:22

## 2019-11-24 RX ADMIN — HYDROMORPHONE HYDROCHLORIDE 1 MILLIGRAM(S): 2 INJECTION INTRAMUSCULAR; INTRAVENOUS; SUBCUTANEOUS at 05:14

## 2019-11-24 RX ADMIN — OXYCODONE HYDROCHLORIDE 5 MILLIGRAM(S): 5 TABLET ORAL at 23:23

## 2019-11-24 RX ADMIN — GABAPENTIN 300 MILLIGRAM(S): 400 CAPSULE ORAL at 05:14

## 2019-11-24 RX ADMIN — Medication 650 MILLIGRAM(S): at 06:10

## 2019-11-24 RX ADMIN — OXYCODONE HYDROCHLORIDE 10 MILLIGRAM(S): 5 TABLET ORAL at 19:35

## 2019-11-24 RX ADMIN — CYCLOBENZAPRINE HYDROCHLORIDE 5 MILLIGRAM(S): 10 TABLET, FILM COATED ORAL at 22:54

## 2019-11-24 RX ADMIN — HYDROMORPHONE HYDROCHLORIDE 1 MILLIGRAM(S): 2 INJECTION INTRAMUSCULAR; INTRAVENOUS; SUBCUTANEOUS at 11:32

## 2019-11-24 RX ADMIN — GABAPENTIN 300 MILLIGRAM(S): 400 CAPSULE ORAL at 21:34

## 2019-11-24 RX ADMIN — Medication 4 MILLIGRAM(S): at 17:30

## 2019-11-24 RX ADMIN — OXYCODONE HYDROCHLORIDE 10 MILLIGRAM(S): 5 TABLET ORAL at 19:54

## 2019-11-24 RX ADMIN — Medication 650 MILLIGRAM(S): at 00:06

## 2019-11-24 RX ADMIN — Medication 650 MILLIGRAM(S): at 05:13

## 2019-11-24 RX ADMIN — OXYCODONE HYDROCHLORIDE 10 MILLIGRAM(S): 5 TABLET ORAL at 10:15

## 2019-11-24 RX ADMIN — PANTOPRAZOLE SODIUM 40 MILLIGRAM(S): 20 TABLET, DELAYED RELEASE ORAL at 07:22

## 2019-11-24 RX ADMIN — Medication 4 MILLIGRAM(S): at 22:58

## 2019-11-24 RX ADMIN — HYDROMORPHONE HYDROCHLORIDE 1 MILLIGRAM(S): 2 INJECTION INTRAMUSCULAR; INTRAVENOUS; SUBCUTANEOUS at 11:45

## 2019-11-24 RX ADMIN — GABAPENTIN 300 MILLIGRAM(S): 400 CAPSULE ORAL at 13:00

## 2019-11-24 RX ADMIN — HYDROMORPHONE HYDROCHLORIDE 1 MILLIGRAM(S): 2 INJECTION INTRAMUSCULAR; INTRAVENOUS; SUBCUTANEOUS at 01:36

## 2019-11-24 RX ADMIN — HYDROMORPHONE HYDROCHLORIDE 1 MILLIGRAM(S): 2 INJECTION INTRAMUSCULAR; INTRAVENOUS; SUBCUTANEOUS at 21:34

## 2019-11-24 RX ADMIN — HYDROMORPHONE HYDROCHLORIDE 1 MILLIGRAM(S): 2 INJECTION INTRAMUSCULAR; INTRAVENOUS; SUBCUTANEOUS at 22:04

## 2019-11-24 RX ADMIN — SENNA PLUS 2 TABLET(S): 8.6 TABLET ORAL at 21:34

## 2019-11-24 RX ADMIN — Medication 25 MILLIGRAM(S): at 01:53

## 2019-11-24 NOTE — PHYSICAL THERAPY INITIAL EVALUATION ADULT - ADDITIONAL COMMENTS
elevator building, patient was ambulating with sc PTA, reports he felt dizzy at home leading to his fall PTA

## 2019-11-24 NOTE — PHYSICAL THERAPY INITIAL EVALUATION ADULT - CRITERIA FOR SKILLED THERAPEUTIC INTERVENTIONS
anticipated discharge recommendation/therapy frequency/risk reduction/prevention/rehab potential/anticipated equipment needs at discharge/impairments found

## 2019-11-24 NOTE — PHYSICAL THERAPY INITIAL EVALUATION ADULT - PERTINENT HX OF CURRENT PROBLEM, REHAB EVAL
54y Male s/p L4-S1 ALIF/PSF on 11/13 and discharged on 11/19 presents to  ED with worsening of lower back pain and increased numbness in his RLE. Patient states that pain has been unrelieved since his surgery with medications. Denies any new trauma to lower back. Of note, patient fell onto his right hand and also endorses pain in his R thumb MCP joint. XR negative for fractures, likely ligamentous sprain.

## 2019-11-24 NOTE — PHYSICAL THERAPY INITIAL EVALUATION ADULT - PLANNED THERAPY INTERVENTIONS, PT EVAL
balance training/strengthening/gait training/postural re-education/bed mobility training/transfer training/neuromuscular re-education

## 2019-11-24 NOTE — PHYSICAL THERAPY INITIAL EVALUATION ADULT - ACTIVE RANGE OF MOTION EXAMINATION, REHAB EVAL
Left UE Active ROM was WNL (within normal limits)/bilateral upper extremity Active ROM was WFL (within functional limits)/bilateral  lower extremity Active ROM was WFL (within functional limits)/seated hip flexion 3/4 range bilateral, WNL in supine

## 2019-11-24 NOTE — PHYSICAL THERAPY INITIAL EVALUATION ADULT - MANUAL MUSCLE TESTING RESULTS, REHAB EVAL
bilateral UE grossly >/=3+/5 as seen through functional assessment, bilateral LE grossly 5/5 grade strength except bilateral hip flexion 4-/5

## 2019-11-24 NOTE — PROGRESS NOTE ADULT - SUBJECTIVE AND OBJECTIVE BOX
Ortho Note    Continues to endorse pain, numbness and tingling. states analgesia helps  Denies CP, SOB, N/V,    Vital Signs Last 24 Hrs  AVSS    General: In NAD  Prineo Dressing CDI  Drains: HVx1 with SS drainage  Motor:  LLE- Hip Flex/Knee Ext 5/5; TA/EHL/GS 5/5 strength  RLE- Hip Flex/Knee Ext 5/5; TA/EHL/GS 4+/5 strength  Sensory:  LLE- Diminished sensation corresponding to L4-S1 dermatomes better than RLE that is stable; Otherwise SILT L2-3 dermatomes   RLE- Diminished sensation corresponding to L4-S1 dermatomes worse than LLE that is worsening compared to prior admission; Otherwise SILT L2-3 dermatomes  Vascular:  B/l lower extremities without significant swelling/warmth/erythema; Not concerning for presence of DVT at this time  Feet WWP; DP 2+ bilaterally; Cap refill < 2 sec                          12.8   13.55 )-----------( 233      ( 24 Nov 2019 06:10 )             38.5   24 Nov 2019 06:10    138    |  103    |  8      ----------------------------<  112    4.0     |  25     |  0.96           A/P: 54y Male s/p L4-S1 ALIF/PSF on 11/13 now s/p removal of R hardware 11/23/19    - Pain control  - Nausea control/Bowel regiment  - Regular diet  - Home meds  - WBAT  - [place] [facility] [place]    [place]tho Pager [phone]

## 2019-11-25 ENCOUNTER — TRANSCRIPTION ENCOUNTER (OUTPATIENT)
Age: 54
End: 2019-11-25

## 2019-11-25 DIAGNOSIS — M50.01 CERVICAL DISC DISORDER WITH MYELOPATHY, HIGH CERVICAL REGION: ICD-10-CM

## 2019-11-25 DIAGNOSIS — K57.92 DIVERTICULITIS OF INTESTINE, PART UNSPECIFIED, WITHOUT PERFORATION OR ABSCESS WITHOUT BLEEDING: ICD-10-CM

## 2019-11-25 DIAGNOSIS — M51.16 INTERVERTEBRAL DISC DISORDERS WITH RADICULOPATHY, LUMBAR REGION: ICD-10-CM

## 2019-11-25 DIAGNOSIS — S84.11XA: ICD-10-CM

## 2019-11-25 DIAGNOSIS — M48.061 SPINAL STENOSIS, LUMBAR REGION WITHOUT NEUROGENIC CLAUDICATION: ICD-10-CM

## 2019-11-25 DIAGNOSIS — K21.0 GASTRO-ESOPHAGEAL REFLUX DISEASE WITH ESOPHAGITIS: ICD-10-CM

## 2019-11-25 DIAGNOSIS — R00.0 TACHYCARDIA, UNSPECIFIED: ICD-10-CM

## 2019-11-25 DIAGNOSIS — T14.8XXA OTHER INJURY OF UNSPECIFIED BODY REGION, INITIAL ENCOUNTER: ICD-10-CM
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
## 2019-11-25 DIAGNOSIS — M21.371 FOOT DROP, RIGHT FOOT: ICD-10-CM

## 2019-11-25 DIAGNOSIS — M50.11 CERVICAL DISC DISORDER WITH RADICULOPATHY, HIGH CERVICAL REGION: ICD-10-CM

## 2019-11-25 DIAGNOSIS — G62.89 OTHER SPECIFIED POLYNEUROPATHIES: ICD-10-CM

## 2019-11-25 DIAGNOSIS — G57.01 LESION OF SCIATIC NERVE, RIGHT LOWER LIMB: ICD-10-CM

## 2019-11-25 LAB
ANION GAP SERPL CALC-SCNC: 10 MMOL/L — SIGNIFICANT CHANGE UP (ref 5–17)
BUN SERPL-MCNC: 15 MG/DL — SIGNIFICANT CHANGE UP (ref 7–23)
CALCIUM SERPL-MCNC: 9.5 MG/DL — SIGNIFICANT CHANGE UP (ref 8.4–10.5)
CHLORIDE SERPL-SCNC: 99 MMOL/L — SIGNIFICANT CHANGE UP (ref 96–108)
CO2 SERPL-SCNC: 24 MMOL/L — SIGNIFICANT CHANGE UP (ref 22–31)
CREAT SERPL-MCNC: 0.84 MG/DL — SIGNIFICANT CHANGE UP (ref 0.5–1.3)
GLUCOSE SERPL-MCNC: 163 MG/DL — HIGH (ref 70–99)
HCT VFR BLD CALC: 42.3 % — SIGNIFICANT CHANGE UP (ref 39–50)
HGB BLD-MCNC: 14.2 G/DL — SIGNIFICANT CHANGE UP (ref 13–17)
MCHC RBC-ENTMCNC: 28.7 PG — SIGNIFICANT CHANGE UP (ref 27–34)
MCHC RBC-ENTMCNC: 33.6 GM/DL — SIGNIFICANT CHANGE UP (ref 32–36)
MCV RBC AUTO: 85.5 FL — SIGNIFICANT CHANGE UP (ref 80–100)
NRBC # BLD: 0 /100 WBCS — SIGNIFICANT CHANGE UP (ref 0–0)
PLATELET # BLD AUTO: 280 K/UL — SIGNIFICANT CHANGE UP (ref 150–400)
POTASSIUM SERPL-MCNC: 4.1 MMOL/L — SIGNIFICANT CHANGE UP (ref 3.5–5.3)
POTASSIUM SERPL-SCNC: 4.1 MMOL/L — SIGNIFICANT CHANGE UP (ref 3.5–5.3)
RBC # BLD: 4.95 M/UL — SIGNIFICANT CHANGE UP (ref 4.2–5.8)
RBC # FLD: 13.2 % — SIGNIFICANT CHANGE UP (ref 10.3–14.5)
SODIUM SERPL-SCNC: 133 MMOL/L — LOW (ref 135–145)
SURGICAL PATHOLOGY STUDY: SIGNIFICANT CHANGE UP
WBC # BLD: 15.13 K/UL — HIGH (ref 3.8–10.5)
WBC # FLD AUTO: 15.13 K/UL — HIGH (ref 3.8–10.5)

## 2019-11-25 PROCEDURE — 72131 CT LUMBAR SPINE W/O DYE: CPT

## 2019-11-25 PROCEDURE — C1889: CPT

## 2019-11-25 PROCEDURE — 86900 BLOOD TYPING SEROLOGIC ABO: CPT

## 2019-11-25 PROCEDURE — 80048 BASIC METABOLIC PNL TOTAL CA: CPT

## 2019-11-25 PROCEDURE — 97530 THERAPEUTIC ACTIVITIES: CPT

## 2019-11-25 PROCEDURE — 99232 SBSQ HOSP IP/OBS MODERATE 35: CPT

## 2019-11-25 PROCEDURE — 85027 COMPLETE CBC AUTOMATED: CPT

## 2019-11-25 PROCEDURE — 97161 PT EVAL LOW COMPLEX 20 MIN: CPT

## 2019-11-25 PROCEDURE — 72141 MRI NECK SPINE W/O DYE: CPT

## 2019-11-25 PROCEDURE — 86901 BLOOD TYPING SEROLOGIC RH(D): CPT

## 2019-11-25 PROCEDURE — 88304 TISSUE EXAM BY PATHOLOGIST: CPT

## 2019-11-25 PROCEDURE — 93970 EXTREMITY STUDY: CPT

## 2019-11-25 PROCEDURE — 95940 IONM IN OPERATNG ROOM 15 MIN: CPT

## 2019-11-25 PROCEDURE — 72148 MRI LUMBAR SPINE W/O DYE: CPT

## 2019-11-25 PROCEDURE — C1713: CPT

## 2019-11-25 PROCEDURE — 76000 FLUOROSCOPY <1 HR PHYS/QHP: CPT

## 2019-11-25 PROCEDURE — 97116 GAIT TRAINING THERAPY: CPT

## 2019-11-25 PROCEDURE — 86850 RBC ANTIBODY SCREEN: CPT

## 2019-11-25 PROCEDURE — 72146 MRI CHEST SPINE W/O DYE: CPT

## 2019-11-25 PROCEDURE — 36415 COLL VENOUS BLD VENIPUNCTURE: CPT

## 2019-11-25 RX ORDER — MAGNESIUM HYDROXIDE 400 MG/1
30 TABLET, CHEWABLE ORAL ONCE
Refills: 0 | Status: COMPLETED | OUTPATIENT
Start: 2019-11-25 | End: 2019-11-25

## 2019-11-25 RX ORDER — DEXAMETHASONE 0.5 MG/5ML
4 ELIXIR ORAL EVERY 8 HOURS
Refills: 0 | Status: COMPLETED | OUTPATIENT
Start: 2019-11-25 | End: 2019-11-26

## 2019-11-25 RX ORDER — POLYETHYLENE GLYCOL 3350 17 G/17G
17 POWDER, FOR SOLUTION ORAL DAILY
Refills: 0 | Status: DISCONTINUED | OUTPATIENT
Start: 2019-11-25 | End: 2019-11-26

## 2019-11-25 RX ADMIN — OXYCODONE HYDROCHLORIDE 5 MILLIGRAM(S): 5 TABLET ORAL at 05:21

## 2019-11-25 RX ADMIN — Medication 25 MILLIGRAM(S): at 02:32

## 2019-11-25 RX ADMIN — HYDROMORPHONE HYDROCHLORIDE 1 MILLIGRAM(S): 2 INJECTION INTRAMUSCULAR; INTRAVENOUS; SUBCUTANEOUS at 03:03

## 2019-11-25 RX ADMIN — HYDROMORPHONE HYDROCHLORIDE 1 MILLIGRAM(S): 2 INJECTION INTRAMUSCULAR; INTRAVENOUS; SUBCUTANEOUS at 12:01

## 2019-11-25 RX ADMIN — MAGNESIUM HYDROXIDE 30 MILLILITER(S): 400 TABLET, CHEWABLE ORAL at 16:37

## 2019-11-25 RX ADMIN — HYDROMORPHONE HYDROCHLORIDE 1 MILLIGRAM(S): 2 INJECTION INTRAMUSCULAR; INTRAVENOUS; SUBCUTANEOUS at 21:32

## 2019-11-25 RX ADMIN — SENNA PLUS 2 TABLET(S): 8.6 TABLET ORAL at 21:31

## 2019-11-25 RX ADMIN — Medication 4 MILLIGRAM(S): at 05:21

## 2019-11-25 RX ADMIN — OXYCODONE HYDROCHLORIDE 10 MILLIGRAM(S): 5 TABLET ORAL at 11:16

## 2019-11-25 RX ADMIN — HYDROMORPHONE HYDROCHLORIDE 1 MILLIGRAM(S): 2 INJECTION INTRAMUSCULAR; INTRAVENOUS; SUBCUTANEOUS at 02:33

## 2019-11-25 RX ADMIN — OXYCODONE HYDROCHLORIDE 10 MILLIGRAM(S): 5 TABLET ORAL at 10:03

## 2019-11-25 RX ADMIN — PANTOPRAZOLE SODIUM 40 MILLIGRAM(S): 20 TABLET, DELAYED RELEASE ORAL at 07:46

## 2019-11-25 RX ADMIN — OXYCODONE HYDROCHLORIDE 5 MILLIGRAM(S): 5 TABLET ORAL at 05:30

## 2019-11-25 RX ADMIN — GABAPENTIN 300 MILLIGRAM(S): 400 CAPSULE ORAL at 05:22

## 2019-11-25 RX ADMIN — HYDROMORPHONE HYDROCHLORIDE 1 MILLIGRAM(S): 2 INJECTION INTRAMUSCULAR; INTRAVENOUS; SUBCUTANEOUS at 13:00

## 2019-11-25 RX ADMIN — HYDROMORPHONE HYDROCHLORIDE 1 MILLIGRAM(S): 2 INJECTION INTRAMUSCULAR; INTRAVENOUS; SUBCUTANEOUS at 07:47

## 2019-11-25 RX ADMIN — Medication 4 MILLIGRAM(S): at 11:13

## 2019-11-25 RX ADMIN — HYDROMORPHONE HYDROCHLORIDE 1 MILLIGRAM(S): 2 INJECTION INTRAMUSCULAR; INTRAVENOUS; SUBCUTANEOUS at 21:49

## 2019-11-25 RX ADMIN — GABAPENTIN 300 MILLIGRAM(S): 400 CAPSULE ORAL at 21:31

## 2019-11-25 RX ADMIN — Medication 4 MILLIGRAM(S): at 19:03

## 2019-11-25 RX ADMIN — MAGNESIUM HYDROXIDE 30 MILLILITER(S): 400 TABLET, CHEWABLE ORAL at 21:31

## 2019-11-25 NOTE — PROGRESS NOTE ADULT - SUBJECTIVE AND OBJECTIVE BOX
ORTHO NOTE    [x ] Pt seen/examined.  [ ] Pt without any complaints/in NAD.    [x ] Pt complains of: right foot and lateral aspect of right lower extremity "numbness" from right knee down.  Reports right foot pain is greatly improved since surgery and start of steroids      ROS: [ ] Fever  [ ] Chills  [ ] CP [ ] SOB [ ] Dysnea  [ ] Palpitations [ ] Cough [ ] N/V/C/D [ ] Paresthia [ ] Other     [x ] ROS  otherwise negative    .    PHYSICAL EXAM:    Vital Signs Last 24 Hrs  T(C): 36.7 (25 Nov 2019 08:50), Max: 37 (25 Nov 2019 05:55)  T(F): 98 (25 Nov 2019 08:50), Max: 98.6 (25 Nov 2019 05:55)  HR: 95 (25 Nov 2019 08:50) (91 - 108)  BP: 127/80 (25 Nov 2019 08:50) (90/70 - 127/80)  BP(mean): --  RR: 15 (25 Nov 2019 08:50) (15 - 17)  SpO2: 95% (25 Nov 2019 08:50) (94% - 97%)    I&O's Detail    24 Nov 2019 07:01  -  25 Nov 2019 07:00  --------------------------------------------------------  IN:    Oral Fluid: 680 mL  Total IN: 680 mL    OUT:    Voided: 700 mL  Total OUT: 700 mL    Total NET: -20 mL           CAPILLARY BLOOD GLUCOSE                      Neuro: AAOx3, requires verbal cues to focus    Lungs: nonlabored, spo2 wnl on RA    CV:    ABD: soft, nontender    Ext:  Back: Prineo Dressing x2 CDI; hemovac drain with minimal sanguinous output to suction  Motor:  LLE- Hip Flex/Knee Ext 5/5; TA/EHL/GS 5/5 strength  RLE- Hip Flex/Knee Ext 5/5; TA/EHL/GS 4+/5 strength that is improved from prior exams  Sensory:  LLE- Diminished sensation corresponding to L4-S1 dermatomes better than RLE that is stable; Otherwise SILT L2-3 dermatomes   RLE- Diminished sensation corresponding to L4-S1 dermatomes worse than LLE that is improving compared to prior admission; Otherwise SILT L2-3 dermatomes  Vascular:  Feet WWP; DP 2+ bilaterally; Cap refill < 2 sec    LABS                        14.2   15.13 )-----------( 280      ( 25 Nov 2019 10:28 )             42.3                                11-25    133<L>  |  99  |  15  ----------------------------<  163<H>  4.1   |  24  |  0.84    Ca    9.5      25 Nov 2019 10:28        [ ] Other Labs  [ ] None ordered            Please check or [place] when present:  •  Heart Failure:    [ ] Acute        [ ]  Acute on Chronic        [ ] Chronic         [ ] Diastolic     [ ]  Combined    •  ARNOLD:     [ ] ATN        [ ]  Renal medullary necrosis       [ ]  Renal cortical necrosis                  [ ] Other pathological Lesion:  •  CKD:  [ ] Stage I   [ ] Stage II  [ ] Stage III    [ ]Stage IV   [ ]  CKD V   [ ]  Other/Unspecified:    •  Abdominal Nutritional Status:   [ ] Malnutrition-See Nutrition note    [ ] Cachexia   [ ]  Other        [ ] Supplement ordered:            [ ] Morbid Obesity: BMI >=40         ASSESSMENT/PLAN:      STATUS POST: pod2 removal of hardware spine revision s/p L4-S1 ALIF/PSF   R LE paresthesias and weakness- Discussed findings with Dr. Montero and CT myelogram to be performed today, emg nerve conduction study ordered.  Appreciate neurology consult  continue HV x1 to suction  pain control- decadron 4mg q 8 today, encouraged po medication prn opioids  bowel regimen  Improving with physical therapy ambulating with rolling walker and negotiated 3 steps  continue R UE thumb spica splint for right thumb mcp pain  CONTINUE:          [ ] PT- wbat, spinal precautions    [ ] DVT PPX- scd    [ ] Pain Mgt- po meds    [ ] Dispo plan- home, HPT pending studies and drain output        RLE paresthesias/weakness are improving overnight since OR and initiation of steroids

## 2019-11-25 NOTE — DIETITIAN INITIAL EVALUATION ADULT. - ENERGY NEEDS
Height: 65" Weight: 195.9lbs/88.9kg,  lbs+/-10%, %%, BMI 32.7 kg/m2  IBW used for calculations as pt >120% of IBW. Needs adjusted for post-op and obesity.

## 2019-11-25 NOTE — DISCHARGE NOTE PROVIDER - NSDCFUADDINST_GEN_ALL_CORE_FT
Wear right thumb spica velcro brace to support pain in right thumb  No strenuous activity (bending/twisting), heavy lifting, driving or returning to work until cleared by MD.  You may shower. Remove dressing daily before shower, pat the area dry gently then reapply dry gauze dressing x 5 days, then leave incision open to air.   Try to have regular bowel movements, take stool softener or laxative if necessary.  May take pepcid or zantac for upset stomach.  Ice affected areas to decrease swelling.  Call to schedule an appt with Dr. Montero for follow up, if you have staples or sutures they will be removed in office.  Contact your doctor if you experience: fever greater than 101.5, chills, chest pain, difficulty breathing, redness or excessive drainage around the incision, other concerns. Wear right thumb spica velcro brace to support pain in right thumb  Complete medrol dose pack.  Gabapentin dose has been increased.  No strenuous activity (bending/twisting), heavy lifting, driving or returning to work until cleared by MD.  You may shower. Remove drain dressing tomorrow then leave incision open to air.   Try to have regular bowel movements, take stool softener or laxative if necessary.  Ice affected areas to decrease swelling.  Call to schedule an appt with Dr. Montero for follow up, if you have staples or sutures they will be removed in office.  Contact your doctor if you experience: fever greater than 101.5, chills, chest pain, difficulty breathing, redness or excessive drainage around the incision, other concerns.

## 2019-11-25 NOTE — CONSULT NOTE ADULT - SUBJECTIVE AND OBJECTIVE BOX
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 24 Hrs  T(C): 36.6 (25 Nov 2019 14:48), Max: 37 (25 Nov 2019 05:55)  T(F): 97.9 (25 Nov 2019 14:48), Max: 98.6 (25 Nov 2019 05:55)  HR: 92 (25 Nov 2019 14:48) (91 - 108)  BP: 122/85 (25 Nov 2019 14:48) (90/70 - 127/80)  BP(mean): --  RR: 17 (25 Nov 2019 14:48) (15 - 17)  SpO2: 93% (25 Nov 2019 14:48) (93% - 97%)    PHYSICAL EXAMINATION:  Constitutional: WDWN; NAD  Eyes: Conjunctiva and sclera clear.  Cardiovascular: Regular rate and rhythm; S1 and S2 Normal; No murmurs, gallops or rubs.  Neurologic:  - Mental Status:  AAOx3; conversant  - Cranial Nerves II-XII:    II:  Visual acuity is 20/20 bilaterally; Visual fields are full to confrontation;  Pupils are equal, round, and reactive to light.  III, IV, VI:  Extraocular movements are intact without nystagmus.  V:  Facial sensation is intact in the V1-V3 distribution bilaterally.  VII:  Face is symmetric with normal eye closure and smile  VIII:  Hearing is intact to finger rub.  IX, X:  Uvula is midline and soft palate rises symmetrically  XI:  Head turning and shoulder shrug are intact.  XII:  Tongue protrudes in the midline.    - Motor:  RUE: 4/5 strength   LUE: 4/5 strength  LLE: hip flexion 4/5, dorsiflexion: 5/5, plantarflexion 5/5  RLE: hip flexion 4/5, dorsiflexion: 2/5, plantarflexion 5/5, Normal muscle bulk and tone throughout. Noted right foot drop.  - Reflexes:  1+ and symmetric at the biceps and brachioradialis. Left patella 2+, left ankle reflex 2+, right patella 1+, right ankle with diminished to no reflexes,  Plantar responses flexor.  - Sensory:  Intact to light touch, pin prick, vibration, and joint-position sense throughout EXCEPT for lateral portion of right leg with decreased sensation, decreased vibration sense in the left lower tibia and left foot   - Coordination:  Deferred  - Gait:   Deferred    LABS:                        14.2   15.13 )-----------( 280      ( 25 Nov 2019 10:28 )             42.3     11-25    133<L>  |  99  |  15  ----------------------------<  163<H>  4.1   |  24  |  0.84    Ca    9.5      25 Nov 2019 10:28            RADIOLOGY & ADDITIONAL STUDIES:      IMPRESSION & RECOMMENDATIONS:

## 2019-11-25 NOTE — PROGRESS NOTE ADULT - SUBJECTIVE AND OBJECTIVE BOX
Orthopaedic Spine Resident Note    **Patient seen and examined earlier this AM**    S:  Patient states RLE paresthesias/weakness are improving overnight since OR and initiation of steroids  No fevers/chills/shortness of breath/chest pain.  Pt with mild injury to R thumb due to fall at home    O:  Vital Signs Last 24 Hrs  T(C): 36.7 (25 Nov 2019 08:50), Max: 37.9 (24 Nov 2019 14:12)  T(F): 98 (25 Nov 2019 08:50), Max: 100.2 (24 Nov 2019 14:12)  HR: 95 (25 Nov 2019 08:50) (91 - 108)  BP: 127/80 (25 Nov 2019 08:50) (90/70 - 127/80)  BP(mean): --  RR: 15 (25 Nov 2019 08:50) (15 - 17)  SpO2: 95% (25 Nov 2019 08:50) (92% - 97%)  VSS  General: Well-appearing adult Male lying supine; NAD; A&Ox3  Back: Prineo Dressing CDI; HV x1 w minimal sanguinous output over 24 hrs  Motor:  LLE- Hip Flex/Knee Ext 5/5; TA/EHL/GS 5/5 strength  RLE- Hip Flex/Knee Ext 5/5; TA/EHL/GS 4+/5 strength that is improved from prior exams  Sensory:  LLE- Diminished sensation corresponding to L4-S1 dermatomes better than RLE that is stable; Otherwise SILT L2-3 dermatomes   RLE- Diminished sensation corresponding to L4-S1 dermatomes worse than LLE that is improving compared to prior admission; Otherwise SILT L2-3 dermatomes  Vascular:  Feet WWP; DP 2+ bilaterally; Cap refill < 2 sec    Labs:                        14.2   15.13 )-----------( 280      ( 25 Nov 2019 10:28 )             42.3       11-25    133<L>  |  99  |  15  ----------------------------<  163<H>  4.1   |  24  |  0.84    Ca    9.5      25 Nov 2019 10:28

## 2019-11-25 NOTE — DISCHARGE NOTE PROVIDER - NSDCCPTREATMENT_GEN_ALL_CORE_FT
PRINCIPAL PROCEDURE  Procedure: Revision, procedure involving hardware, spine, lumbar  Findings and Treatment: PRINCIPAL PROCEDURE  Procedure: Revision, procedure involving hardware, spine, lumbar  Findings and Treatment: Loose Right L5-S1 screw

## 2019-11-25 NOTE — DIETITIAN INITIAL EVALUATION ADULT. - OTHER INFO
54M with hx of Reflux esophagitis, Lumbar radiculopathy, Diverticulitis, H/O umbilical hernia repair, s/p colon resection: 6/2016 with recent admission s/p L4-S1 ALIF/PSF on 11/13 and d/c now readmitting with worsening lower back pain and increasing numbness RLE. S/p Revision PSF hardware removal 11/23, POD #2. Pt doing well s/p surgery but cont c/o pain, numbness, tingling, but stating analgesia helps. Currently pt is on a regular diet tolerating well denying N/V/Abd pain. Pt tolerating % of meals- confirmed per discussion with RN. Continue to encourage adequate PO intake to aid with wound healing- discussed the importance of lean proteins with patient- pt appears receptive to discussion. No major changes in weight noted from previous admit to this admission. NKFA. Pt follows regular diet at home. GI: WDL. Skin: Surgical incision noted, jennifer score 20. Pain: noted persistent back pain. Please see below for nutrition recommendations. RD to follow up per protocol.

## 2019-11-25 NOTE — CONSULT NOTE ADULT - ASSESSMENT
55yo M PMHx lumbar stenosis s/p L4-21 anterior lumbar interbody fusion (ALIF) and posterior spinal fusion (PSF) on 11/13 and subsequent discharge on 11/19, readmitted for right sided hardware removal of spinal procedure on 11/23, presenting with intermittent and sharp right leg pain as well as  numbness and tingling of right foot    #Right leg pain  Patient being reevaluated for intermittent right sharp leg pain and numbness and tingling of right foot. Pt reports his symptoms have improved while on dexamethasone s/p hardware removal. There is still a high suspicion of peroneal nerve involvement a 53yo M PMHx lumbar stenosis s/p L4-21 anterior lumbar interbody fusion (ALIF) and posterior spinal fusion (PSF) on 11/13 and subsequent discharge on 11/19, readmitted for right sided hardware removal of spinal procedure on 11/23, presenting with intermittent and sharp right leg pain as well as  numbness and tingling of right foot    #Right leg pain  Patient being reevaluated for intermittent right sharp leg pain and numbness and tingling of right foot. Pt reports his symptoms have improved while on dexamethasone s/p hardware removal. There is still a high suspicion of peroneal nerve neuropathy in setting of entrapment vs recent surgical procedure. Additionally a sciatic neuropathy can also be considered as patient having intermittent shooting pain down right leg    -Full neurological recommendations tomorrow  -Agree with IR myelogram  -c/w gabapentin 300mg TID and flexeril 5mg TID PRN  -PT/OT   -Neurology will follow

## 2019-11-25 NOTE — DISCHARGE NOTE PROVIDER - HOSPITAL COURSE
Admit- painful hardware s/p ALIF, PSF L4-S1    OR- Revision, procedure involving hardware, spine, lumbar 23-Nov-2019    Periop Antibx    DVT ppx    PT     Pain mgt    neurology consult Admit- painful hardware s/p ALIF, PSF L4-S1    MRI    OR- Revision, procedure involving hardware, spine, lumbar 23-Nov-2019    Periop Antibx    DVT ppx    PT     Pain mgt    neurology consult

## 2019-11-25 NOTE — DISCHARGE NOTE PROVIDER - CARE PROVIDER_API CALL
Tj Montero)  Orthopaedic Surgery  [facility] [address] [place] [id number]  Phone: [phone]  Fax: [phone]  Follow Up Time: Tj Montero)  Orthopaedic Surgery  [facility] [address] [place] [address]  Phone: [phone]  Fax: [phone]  Follow Up Time: 2 weeks

## 2019-11-25 NOTE — DISCHARGE NOTE PROVIDER - NSDCCPCAREPLAN_GEN_ALL_CORE_FT
PRINCIPAL DISCHARGE DIAGNOSIS  Diagnosis: Acute right-sided low back pain with right-sided sciatica  Assessment and Plan of Treatment: PRINCIPAL DISCHARGE DIAGNOSIS  Diagnosis: Acute right-sided low back pain with right-sided sciatica  Assessment and Plan of Treatment: improvement s/p Revision PSF L5-S1

## 2019-11-25 NOTE — DISCHARGE NOTE PROVIDER - NSDCMRMEDTOKEN_GEN_ALL_CORE_FT
bisacodyl 10 mg rectal suppository: 1 suppository(ies) rectal once a day, As needed, If no bowel movement by postoperative day #2  cyclobenzaprine 5 mg oral tablet: 1 tab(s) orally every 8 hours, as needed, muscle relaxant MDD:3    esomeprazole 40 mg oral delayed release capsule: 1 cap(s) orally once a day  gabapentin 300 mg oral capsule: 1 cap(s) orally 3 times a day  oxyCODONE-acetaminophen 10 mg-325 mg oral tablet: 1/2 to 1 tab(s) orally every 4 hours, as needed, pain MDD:6    polyethylene glycol 3350 oral powder for reconstitution: 17 gram(s) orally once a day  senna oral tablet: 2 tab(s) orally once a day (at bedtime), As needed, Constipation  tlso brace: 1 TLSO Brace   tlso brace: 1 TLSO BRACE bisacodyl 10 mg rectal suppository: 1 suppository(ies) rectal once a day, As needed, If no bowel movement by postoperative day #2  cyclobenzaprine 5 mg oral tablet: 1 tab(s) orally 3 times a day   esomeprazole 40 mg oral delayed release capsule: 1 cap(s) orally once a day  gabapentin 400 mg oral capsule: 1 cap(s) orally 3 times a day   Medrol Dosepak 4 mg oral tablet: 1-6 tab(s) orally once a day as directed  oxyCODONE-acetaminophen 10 mg-325 mg oral tablet: 1/2 to 1 tab(s) orally every 4 hours, as needed, pain MDD:6    polyethylene glycol 3350 oral powder for reconstitution: 17 gram(s) orally once a day  senna oral tablet: 2 tab(s) orally once a day (at bedtime), As needed, Constipation  tlso brace: 1 TLSO Brace

## 2019-11-25 NOTE — PROGRESS NOTE ADULT - ASSESSMENT
A/P: 54y Male s/p L4-S1 ALIF/PSF on 11/13 c/b RLE paresthesias and weakness now s/p CAROLINE on 11/23    - Stable  - Pain control  - Nausea control/Bowel regiment  - Home meds  - WBAT  - DVT ppx: SCDs  - Plan for CT myelogram today to further evaluate for neurologic pathology  - Continue IV steroids  - Dispo pending    Ortho Pager [phone]

## 2019-11-25 NOTE — DISCHARGE NOTE PROVIDER - NSDCACTIVITY_GEN_ALL_CORE
Showering allowed/Do not make important decisions/Do not drive or operate machinery No heavy lifting/straining/Walking - Outdoors allowed/Walking - Indoors allowed/Do not drive or operate machinery/Showering allowed/Stairs allowed

## 2019-11-26 ENCOUNTER — TRANSCRIPTION ENCOUNTER (OUTPATIENT)
Age: 54
End: 2019-11-26

## 2019-11-26 VITALS
SYSTOLIC BLOOD PRESSURE: 117 MMHG | TEMPERATURE: 98 F | HEART RATE: 75 BPM | DIASTOLIC BLOOD PRESSURE: 74 MMHG | RESPIRATION RATE: 17 BRPM | OXYGEN SATURATION: 92 %

## 2019-11-26 LAB
BUN SERPL-MCNC: 18 MG/DL — SIGNIFICANT CHANGE UP (ref 7–23)
CALCIUM SERPL-MCNC: 9 MG/DL — SIGNIFICANT CHANGE UP (ref 8.4–10.5)
CHLORIDE SERPL-SCNC: 104 MMOL/L — SIGNIFICANT CHANGE UP (ref 96–108)
CO2 SERPL-SCNC: 24 MMOL/L — SIGNIFICANT CHANGE UP (ref 22–31)
CREAT SERPL-MCNC: 0.81 MG/DL — SIGNIFICANT CHANGE UP (ref 0.5–1.3)
GLUCOSE SERPL-MCNC: 151 MG/DL — HIGH (ref 70–99)
HCT VFR BLD CALC: 39.4 % — SIGNIFICANT CHANGE UP (ref 39–50)
HGB BLD-MCNC: 13 G/DL — SIGNIFICANT CHANGE UP (ref 13–17)
MCHC RBC-ENTMCNC: 28.3 PG — SIGNIFICANT CHANGE UP (ref 27–34)
MCHC RBC-ENTMCNC: 33 GM/DL — SIGNIFICANT CHANGE UP (ref 32–36)
MCV RBC AUTO: 85.8 FL — SIGNIFICANT CHANGE UP (ref 80–100)
NRBC # BLD: 0 /100 WBCS — SIGNIFICANT CHANGE UP (ref 0–0)
PLATELET # BLD AUTO: 310 K/UL — SIGNIFICANT CHANGE UP (ref 150–400)
POTASSIUM SERPL-MCNC: 4.3 MMOL/L — SIGNIFICANT CHANGE UP (ref 3.5–5.3)
POTASSIUM SERPL-SCNC: 4.3 MMOL/L — SIGNIFICANT CHANGE UP (ref 3.5–5.3)
RBC # BLD: 4.59 M/UL — SIGNIFICANT CHANGE UP (ref 4.2–5.8)
RBC # FLD: 13.6 % — SIGNIFICANT CHANGE UP (ref 10.3–14.5)
SODIUM SERPL-SCNC: 140 MMOL/L — SIGNIFICANT CHANGE UP (ref 135–145)
WBC # BLD: 22.61 K/UL — HIGH (ref 3.8–10.5)
WBC # FLD AUTO: 22.61 K/UL — HIGH (ref 3.8–10.5)

## 2019-11-26 PROCEDURE — 99222 1ST HOSP IP/OBS MODERATE 55: CPT

## 2019-11-26 RX ORDER — GABAPENTIN 400 MG/1
400 CAPSULE ORAL THREE TIMES A DAY
Refills: 0 | Status: DISCONTINUED | OUTPATIENT
Start: 2019-11-26 | End: 2019-11-26

## 2019-11-26 RX ORDER — SIMETHICONE 80 MG/1
80 TABLET, CHEWABLE ORAL
Refills: 0 | Status: DISCONTINUED | OUTPATIENT
Start: 2019-11-26 | End: 2019-11-26

## 2019-11-26 RX ORDER — DEXAMETHASONE 0.5 MG/5ML
4 ELIXIR ORAL EVERY 12 HOURS
Refills: 0 | Status: DISCONTINUED | OUTPATIENT
Start: 2019-11-26 | End: 2019-11-26

## 2019-11-26 RX ORDER — ZOLPIDEM TARTRATE 10 MG/1
5 TABLET ORAL AT BEDTIME
Refills: 0 | Status: DISCONTINUED | OUTPATIENT
Start: 2019-11-26 | End: 2019-11-26

## 2019-11-26 RX ORDER — GABAPENTIN 400 MG/1
1 CAPSULE ORAL
Qty: 90 | Refills: 0
Start: 2019-11-26 | End: 2019-12-25

## 2019-11-26 RX ORDER — CYCLOBENZAPRINE HYDROCHLORIDE 10 MG/1
1 TABLET, FILM COATED ORAL
Qty: 20 | Refills: 0
Start: 2019-11-26

## 2019-11-26 RX ADMIN — Medication 4 MILLIGRAM(S): at 11:41

## 2019-11-26 RX ADMIN — PANTOPRAZOLE SODIUM 40 MILLIGRAM(S): 20 TABLET, DELAYED RELEASE ORAL at 06:26

## 2019-11-26 RX ADMIN — GABAPENTIN 300 MILLIGRAM(S): 400 CAPSULE ORAL at 06:26

## 2019-11-26 RX ADMIN — HYDROMORPHONE HYDROCHLORIDE 1 MILLIGRAM(S): 2 INJECTION INTRAMUSCULAR; INTRAVENOUS; SUBCUTANEOUS at 02:21

## 2019-11-26 RX ADMIN — SIMETHICONE 80 MILLIGRAM(S): 80 TABLET, CHEWABLE ORAL at 02:07

## 2019-11-26 RX ADMIN — ZOLPIDEM TARTRATE 5 MILLIGRAM(S): 10 TABLET ORAL at 02:37

## 2019-11-26 RX ADMIN — HYDROMORPHONE HYDROCHLORIDE 1 MILLIGRAM(S): 2 INJECTION INTRAMUSCULAR; INTRAVENOUS; SUBCUTANEOUS at 02:07

## 2019-11-26 RX ADMIN — HYDROMORPHONE HYDROCHLORIDE 1 MILLIGRAM(S): 2 INJECTION INTRAMUSCULAR; INTRAVENOUS; SUBCUTANEOUS at 13:54

## 2019-11-26 RX ADMIN — Medication 4 MILLIGRAM(S): at 10:27

## 2019-11-26 RX ADMIN — POLYETHYLENE GLYCOL 3350 17 GRAM(S): 17 POWDER, FOR SOLUTION ORAL at 11:48

## 2019-11-26 RX ADMIN — GABAPENTIN 400 MILLIGRAM(S): 400 CAPSULE ORAL at 14:01

## 2019-11-26 RX ADMIN — HYDROMORPHONE HYDROCHLORIDE 1 MILLIGRAM(S): 2 INJECTION INTRAMUSCULAR; INTRAVENOUS; SUBCUTANEOUS at 13:42

## 2019-11-26 RX ADMIN — Medication 4 MILLIGRAM(S): at 02:07

## 2019-11-26 NOTE — CONSULT NOTE ADULT - ASSESSMENT
53 yo M PMH lumbar stenosis s/p anterior lumbar interbody fusion (ALIF) and posterior spinal fusion (PSF) on 11/13 and subsequent discharge on 11/19, readmitted for right-sided hardware removal of spinal procedure on 11/23, presenting with intermittent and sharp right leg pain as well as numbness and tingling of right foot.    #Right leg pain  Patient reports improvement in shooting-pains with dexamethasone s/p hardware removal.  Dose of gabapentin 300 mg TID is on the low side; can uptitrate for pain improvement.  Unclear if lower right leg pain and lumbar issues are related.  The majority of peripheral neuropathy cases are related to diabetes, thyroid disease, or B12 deficiencies. 53 yo M PMH lumbar stenosis s/p anterior lumbar interbody fusion (ALIF) and posterior spinal fusion (PSF) on 11/13 and subsequent discharge on 11/19, readmitted for right-sided hardware removal of spinal procedure on 11/23, presenting with intermittent and sharp right leg pain as well as numbness and tingling of right foot.    #Right leg pain  Patient reports improvement in shooting-pains with dexamethasone s/p hardware removal.  Dose of gabapentin 300 mg TID is on the low side; will uptitrate to 400 mg TID for pain improvement and adding acetaminophen standing for pain management.  Unclear if lower right leg pain and lumbar issues are related.  The majority of peripheral neuropathy cases are related to diabetes, thyroid disease, or B12 deficiencies.  Recommending H1AC, thyroid panel, and B12 levels to r/o majority of causes of peripheral neuropathy.  B/c of bladder symptoms, AM testosterone and post void bladder scan recommended.  Thoracic MRI recommended if lumbar MRI cannot be obtained to r/o central issues.    -H1AC, thyroid panel, B12, AM testosterone   -post-void bladder scan  -lumbar and thoracic MRI  -uptitrate gabapentin to 400 mg TID  -PT/OT  -neurology will follow

## 2019-11-26 NOTE — PROGRESS NOTE ADULT - SUBJECTIVE AND OBJECTIVE BOX
Orthopaedic Spine Resident Note    S:  Patient states RLE paresthesias/weakness are again improving  No fevers/chills/shortness of breath/chest pain.    O:  Vital Signs Last 24 Hrs  T(C): 36.7 (25 Nov 2019 08:50), Max: 37.9 (24 Nov 2019 14:12)  T(F): 98 (25 Nov 2019 08:50), Max: 100.2 (24 Nov 2019 14:12)  HR: 95 (25 Nov 2019 08:50) (91 - 108)  BP: 127/80 (25 Nov 2019 08:50) (90/70 - 127/80)  BP(mean): --  RR: 15 (25 Nov 2019 08:50) (15 - 17)  SpO2: 95% (25 Nov 2019 08:50) (92% - 97%)  VSS  General: Well-appearing adult Male lying supine; NAD; A&Ox3  Back: Prineo Dressing CDI; HV x1 w minimal sanguinous output over 24 hrs  Motor:  LLE- Hip Flex/Knee Ext 5/5; TA/EHL/GS 5/5 strength  RLE- Hip Flex/Knee Ext 5/5; TA/EHL/GS 4+/5 strength that is stable from prior exams  Sensory:  LLE- Diminished sensation corresponding to L4-S1 dermatomes better than RLE that is improving; Otherwise SILT L2-3 dermatomes   RLE- Diminished sensation corresponding to L4-S1 dermatomes worse than LLE that is continuing to improve; Otherwise SILT L2-3 dermatomes  Vascular:  Feet WWP; DP 2+ bilaterally; Cap refill < 2 sec    Labs:                        14.2   15.13 )-----------( 280      ( 25 Nov 2019 10:28 )             42.3       11-25    133<L>  |  99  |  15  ----------------------------<  163<H>  4.1   |  24  |  0.84    Ca    9.5      25 Nov 2019 10:28

## 2019-11-26 NOTE — PROGRESS NOTE ADULT - ASSESSMENT
A/P: 54y Male s/p L4-S1 ALIF/PSF on 11/13 c/b RLE paresthesias and weakness now s/p CAROLINE on 11/23    - Stable  - Pain control  - Nausea control/Bowel regiment  - Home meds  - WBAT  - DVT ppx: SCDs  - Plan for CT myelogram today to further evaluate for neurologic pathology -- f/u w IR  - Continue IV steroids for now  - Dispo pending    Ortho Pager [phone] A/P: 54y Male s/p L4-S1 ALIF/PSF on 11/13 c/b RLE paresthesias and weakness now s/p CAROLINE on 11/23    - Stable  - Pain control  - Nausea control/Bowel regiment  - Home meds  - WBAT  - DVT ppx: SCDs  - Plan for CT myelogram today to further evaluate for neurologic pathology -- f/u w IR  - Continue IV steroids for now  - Plan for EMG today  - Plan to pull drain this AM  - Dispo pending    Ortho Pager [phone]

## 2019-11-26 NOTE — PROGRESS NOTE ADULT - SUBJECTIVE AND OBJECTIVE BOX
Ortho Note    Pt comfortable without complaints, states RLE symptoms improving  Denies CP, SOB, N/V, numbness/tingling     Vital Signs Last 24 Hrs  T(C): 37.1 (11-26-19 @ 08:19), Max: 37.1 (11-26-19 @ 08:19)  T(F): 98.7 (11-26-19 @ 08:19), Max: 98.7 (11-26-19 @ 08:19)  HR: 73 (11-26-19 @ 08:19) (73 - 73)  BP: 131/76 (11-26-19 @ 08:19) (131/76 - 131/76)  BP(mean): --  RR: 17 (11-26-19 @ 08:19) (17 - 17)  SpO2: 93% (11-26-19 @ 08:19) (93% - 93%)    General: Pt Alert and oriented, NAD  DSG C/D/I back, Drain d/c'd using aseptic technique, area cleaned with chlorhexidine   Pulses intact b/l LE  Sensation intact b/l LE  Motor: EHL/FHL/TA/GS 5/5 b/l                          13.0   22.61 )-----------( 310      ( 26 Nov 2019 06:43 )             39.4   26 Nov 2019 06:43    140    |  104    |  18     ----------------------------<  151    4.3     |  24     |  0.81           A/P: 54yMale POD#3 s/p Revision PSF L5-S1  - Stable  - Pain Control  - DVT ppx: scds  - PT, WBS: wbat  - Patient seen with Dr. Montero, improving on IV decadron, too soon after surgery to perform CT myelogram, d/c with steroid taper and f/u outpatient    Ortho Pager [phone]

## 2019-11-26 NOTE — DISCHARGE NOTE NURSING/CASE MANAGEMENT/SOCIAL WORK - PATIENT PORTAL LINK FT
You can access the FollowMyHealth Patient Portal offered by [facility] by registering at the following website: [email or web address]. By joining [facility]’s FollowMyHealth portal, you will also be able to view your health information using other applications (apps) compatible with our system.

## 2019-12-04 DIAGNOSIS — Z98.1 ARTHRODESIS STATUS: ICD-10-CM
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
## 2019-12-04 DIAGNOSIS — M54.16 RADICULOPATHY, LUMBAR REGION: ICD-10-CM

## 2019-12-04 DIAGNOSIS — Z90.49 ACQUIRED ABSENCE OF OTHER SPECIFIED PARTS OF DIGESTIVE TRACT: ICD-10-CM

## 2019-12-04 DIAGNOSIS — T84.84XA PAIN DUE TO INTERNAL ORTHOPEDIC PROSTHETIC DEVICES, IMPLANTS AND GRAFTS, INITIAL ENCOUNTER: ICD-10-CM

## 2019-12-04 DIAGNOSIS — M54.5 LOW BACK PAIN: ICD-10-CM

## 2019-12-04 DIAGNOSIS — Z98.890 OTHER SPECIFIED POSTPROCEDURAL STATES: ICD-10-CM

## 2019-12-04 DIAGNOSIS — T84.296A OTHER MECHANICAL COMPLICATION OF INTERNAL FIXATION DEVICE OF VERTEBRAE, INITIAL ENCOUNTER: ICD-10-CM

## 2019-12-04 DIAGNOSIS — K21.0 GASTRO-ESOPHAGEAL REFLUX DISEASE WITH ESOPHAGITIS: ICD-10-CM

## 2019-12-09 PROCEDURE — 80053 COMPREHEN METABOLIC PANEL: CPT

## 2019-12-09 PROCEDURE — 97530 THERAPEUTIC ACTIVITIES: CPT

## 2019-12-09 PROCEDURE — 99285 EMERGENCY DEPT VISIT HI MDM: CPT

## 2019-12-09 PROCEDURE — 83605 ASSAY OF LACTIC ACID: CPT

## 2019-12-09 PROCEDURE — 36415 COLL VENOUS BLD VENIPUNCTURE: CPT

## 2019-12-09 PROCEDURE — 72148 MRI LUMBAR SPINE W/O DYE: CPT

## 2019-12-09 PROCEDURE — 76000 FLUOROSCOPY <1 HR PHYS/QHP: CPT

## 2019-12-09 PROCEDURE — 85027 COMPLETE CBC AUTOMATED: CPT

## 2019-12-09 PROCEDURE — 97112 NEUROMUSCULAR REEDUCATION: CPT

## 2019-12-09 PROCEDURE — 80048 BASIC METABOLIC PNL TOTAL CA: CPT

## 2019-12-09 PROCEDURE — 73130 X-RAY EXAM OF HAND: CPT

## 2019-12-09 PROCEDURE — 72131 CT LUMBAR SPINE W/O DYE: CPT

## 2019-12-09 PROCEDURE — 85025 COMPLETE CBC W/AUTO DIFF WBC: CPT

## 2019-12-09 PROCEDURE — 97116 GAIT TRAINING THERAPY: CPT

## 2019-12-09 PROCEDURE — 81003 URINALYSIS AUTO W/O SCOPE: CPT

## 2019-12-09 PROCEDURE — 97162 PT EVAL MOD COMPLEX 30 MIN: CPT

## 2020-11-16 ENCOUNTER — APPOINTMENT (OUTPATIENT)
Dept: SPINE | Facility: CLINIC | Age: 55
End: 2020-11-16
Payer: COMMERCIAL

## 2020-11-16 ENCOUNTER — TRANSCRIPTION ENCOUNTER (OUTPATIENT)
Age: 55
End: 2020-11-16

## 2020-11-16 VITALS
RESPIRATION RATE: 18 BRPM | HEIGHT: 66 IN | HEART RATE: 80 BPM | TEMPERATURE: 98 F | BODY MASS INDEX: 31.34 KG/M2 | DIASTOLIC BLOOD PRESSURE: 77 MMHG | SYSTOLIC BLOOD PRESSURE: 125 MMHG | WEIGHT: 195 LBS

## 2020-11-16 DIAGNOSIS — M54.9 DORSALGIA, UNSPECIFIED: ICD-10-CM

## 2020-11-16 DIAGNOSIS — Z82.49 FAMILY HISTORY OF ISCHEMIC HEART DISEASE AND OTHER DISEASES OF THE CIRCULATORY SYSTEM: ICD-10-CM

## 2020-11-16 DIAGNOSIS — G89.29 DORSALGIA, UNSPECIFIED: ICD-10-CM

## 2020-11-16 DIAGNOSIS — M54.12 RADICULOPATHY, CERVICAL REGION: ICD-10-CM

## 2020-11-16 DIAGNOSIS — Z80.9 FAMILY HISTORY OF MALIGNANT NEOPLASM, UNSPECIFIED: ICD-10-CM

## 2020-11-16 DIAGNOSIS — Z87.19 PERSONAL HISTORY OF OTHER DISEASES OF THE DIGESTIVE SYSTEM: ICD-10-CM

## 2020-11-16 DIAGNOSIS — M54.2 CERVICALGIA: ICD-10-CM

## 2020-11-16 DIAGNOSIS — M50.90 CERVICAL DISC DISORDER, UNSPECIFIED, UNSPECIFIED CERVICAL REGION: ICD-10-CM

## 2020-11-16 DIAGNOSIS — G89.29 CERVICALGIA: ICD-10-CM

## 2020-11-16 PROCEDURE — 99072 ADDL SUPL MATRL&STAF TM PHE: CPT

## 2020-11-16 PROCEDURE — 99203 OFFICE O/P NEW LOW 30 MIN: CPT

## 2020-11-18 PROBLEM — Z82.49 FAMILY HISTORY OF CARDIAC DISORDER: Status: ACTIVE | Noted: 2020-11-18

## 2020-11-18 PROBLEM — Z80.9 FAMILY HISTORY OF MALIGNANT NEOPLASM: Status: ACTIVE | Noted: 2020-11-18

## 2020-11-18 RX ORDER — IBUPROFEN 200 MG
TABLET ORAL
Refills: 0 | Status: ACTIVE | COMMUNITY
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
## 2020-11-18 NOTE — REVIEW OF SYSTEMS
[Feeling Poorly] : feeling poorly [Numbness] : numbness [Tingling] : tingling [Abnormal Sensation] : an abnormal sensation [Difficulty Walking] : difficulty walking [Arthralgias] : arthralgias [Joint Pain] : joint pain [Joint Stiffness] : joint stiffness [Limb Pain] : limb pain [Negative] : Integumentary [Joint Swelling] : no joint swelling

## 2020-11-18 NOTE — DATA REVIEWED
[de-identified] : cervical Spine [de-identified] : CERVICAL MRI [facility] [id number]/2320

## 2020-11-18 NOTE — ASSESSMENT
[FreeTextEntry1] : 55 Year old Man S/P Prior Lumbar Fusion\par Here for second opinion concerning cervical spine\par \par Failed back syndrome. Cervical pain with radiculopathy. Normal neurological exam. No MRI evidence of cervical impingement. No indication for further spinal surgery. Recommend followup pain management and neurology.

## 2020-11-18 NOTE — PHYSICAL EXAM
[General Appearance - Alert] : alert [General Appearance - In No Acute Distress] : in no acute distress [Oriented To Time, Place, And Person] : oriented to person, place, and time [Impaired Insight] : insight and judgment were intact [Cranial Nerves Optic (II)] : visual acuity intact bilaterally,  pupils equal round and reactive to light [Cranial Nerves Oculomotor (III)] : extraocular motion intact [Cranial Nerves Trigeminal (V)] : facial sensation intact symmetrically [Cranial Nerves Facial (VII)] : face symmetrical [Cranial Nerves Vestibulocochlear (VIII)] : hearing was intact bilaterally [Cranial Nerves Glossopharyngeal (IX)] : tongue and palate midline [Cranial Nerves Accessory (XI - Cranial And Spinal)] : head turning and shoulder shrug symmetric [Skin Color & Pigmentation] : normal skin color and pigmentation [Person] : oriented to person [Place] : oriented to place [Time] : oriented to time [Short Term Intact] : short term memory intact [Remote Intact] : remote memory intact [Span Intact] : the attention span was normal [Concentration Intact] : normal concentrating ability [Fluency] : fluency intact [Comprehension] : comprehension intact [Current Events] : adequate knowledge of current events [Past History] : adequate knowledge of personal past history [Vocabulary] : adequate range of vocabulary [Cranial Nerves Hypoglossal (XII)] : there was no tongue deviation with protrusion [Motor Tone] : muscle tone was normal in all four extremities [Motor Strength] : muscle strength was normal in all four extremities [No Muscle Atrophy] : normal bulk in all four extremities [Sensation Tactile Decrease] : light touch was intact [Abnormal Walk] : normal gait [Balance] : balance was intact [2+] : Ankle jerk left 2+ [No Tenderness to Palpation] : no spine tenderness on palpation [Straight-Leg Raise Test - Right] : straight leg raise of the right leg was positive [Past-pointing] : there was no past-pointing [Tremor] : no tremor present [Plantar Reflex Right Only] : normal on the right [Plantar Reflex Left Only] : normal on the left [Bhakta] : Bhakta's sign was not demonstrated [L'Hermitte's] : neck flexion did not produce tingling down the spine/arms [Spurling's - Opposite Side] : Negative Spurling's on opposite side [Spurling's Same Side] : Negative Spurling's on same side [Straight-Leg Raise Test - Left] : straight leg raise of the left leg was negative [] : the neck was supple [Heart Rate And Rhythm] : heart rate was normal and rhythm regular [Abdomen Soft] : soft [FreeTextEntry1] : Slow steady gait

## 2025-03-21 NOTE — DIETITIAN INITIAL EVALUATION ADULT. - NS FNS WEIGHT USED FOR CALC
Federico Nunez,    Your A1c is stable at 5.7%.    Let me know if you have any other questions or concerns,  Dr. Rose ideal